# Patient Record
Sex: MALE | Race: BLACK OR AFRICAN AMERICAN | Employment: OTHER | URBAN - METROPOLITAN AREA
[De-identification: names, ages, dates, MRNs, and addresses within clinical notes are randomized per-mention and may not be internally consistent; named-entity substitution may affect disease eponyms.]

---

## 2017-03-03 ENCOUNTER — HOSPITAL ENCOUNTER (OUTPATIENT)
Age: 62
Discharge: HOME | End: 2017-03-03
Payer: COMMERCIAL

## 2017-03-03 LAB
ANION GAP SERPL CALCULATED.3IONS-SCNC: 18 MMOL/L (ref 9–17)
BUN BLDV-MCNC: 14 MG/DL (ref 8–23)
CHLORIDE BLD-SCNC: 101 MMOL/L (ref 98–107)
CHOLESTEROL/HDL RATIO: 3.7
CHOLESTEROL: 282 MG/DL
CO2: 23 MMOL/L (ref 20–31)
CREAT SERPL-MCNC: 0.99 MG/DL (ref 0.7–1.2)
GFR AFRICAN AMERICAN: >60 ML/MIN
GFR NON-AFRICAN AMERICAN: >60 ML/MIN
GFR SERPL CREATININE-BSD FRML MDRD: NORMAL ML/MIN/{1.73_M2}
GFR SERPL CREATININE-BSD FRML MDRD: NORMAL ML/MIN/{1.73_M2}
GLUCOSE BLD-MCNC: 109 MG/DL (ref 70–99)
HDLC SERPL-MCNC: 76 MG/DL
LDL CHOLESTEROL: 196 MG/DL (ref 0–130)
POTASSIUM SERPL-SCNC: 3.9 MMOL/L (ref 3.7–5.3)
SODIUM BLD-SCNC: 142 MMOL/L (ref 135–144)
THYROXINE, FREE: 1.2 NG/DL (ref 0.93–1.7)
TRIGL SERPL-MCNC: 49 MG/DL
TSH SERPL DL<=0.05 MIU/L-ACNC: 0.51 MIU/L (ref 0.3–5)
VLDLC SERPL CALC-MCNC: ABNORMAL MG/DL (ref 1–30)

## 2017-03-03 PROCEDURE — 84520 ASSAY OF UREA NITROGEN: CPT

## 2017-03-03 PROCEDURE — 84439 ASSAY OF FREE THYROXINE: CPT

## 2017-03-03 PROCEDURE — 82947 ASSAY GLUCOSE BLOOD QUANT: CPT

## 2017-03-03 PROCEDURE — 82565 ASSAY OF CREATININE: CPT

## 2017-03-03 PROCEDURE — 84443 ASSAY THYROID STIM HORMONE: CPT

## 2017-03-03 PROCEDURE — 80051 ELECTROLYTE PANEL: CPT

## 2017-03-03 PROCEDURE — 80061 LIPID PANEL: CPT

## 2017-03-03 PROCEDURE — 36415 COLL VENOUS BLD VENIPUNCTURE: CPT

## 2017-03-14 ENCOUNTER — HOSPITAL ENCOUNTER (EMERGENCY)
Facility: CLINIC | Age: 62
Discharge: HOME | End: 2017-03-14
Attending: EMERGENCY MEDICINE
Payer: COMMERCIAL

## 2017-03-14 ENCOUNTER — APPOINTMENT (OUTPATIENT)
Dept: GENERAL RADIOLOGY | Facility: CLINIC | Age: 62
End: 2017-03-14
Payer: COMMERCIAL

## 2017-03-14 VITALS
DIASTOLIC BLOOD PRESSURE: 80 MMHG | RESPIRATION RATE: 19 BRPM | TEMPERATURE: 97.5 F | HEART RATE: 64 BPM | SYSTOLIC BLOOD PRESSURE: 145 MMHG | OXYGEN SATURATION: 96 %

## 2017-03-14 DIAGNOSIS — R07.89 OTHER CHEST PAIN: Primary | ICD-10-CM

## 2017-03-14 LAB
ABSOLUTE EOS #: 0.2 K/UL (ref 0–0.4)
ABSOLUTE LYMPH #: 1.7 K/UL (ref 1–4.8)
ABSOLUTE MONO #: 1.2 K/UL (ref 0.1–1.2)
ANION GAP SERPL CALCULATED.3IONS-SCNC: 13 MMOL/L (ref 9–17)
BASOPHILS # BLD: 0 % (ref 0–2)
BASOPHILS ABSOLUTE: 0 K/UL (ref 0–0.2)
BNP INTERPRETATION: NORMAL
BUN BLDV-MCNC: 13 MG/DL (ref 8–23)
BUN/CREAT BLD: NORMAL (ref 9–20)
CALCIUM SERPL-MCNC: 8.6 MG/DL (ref 8.6–10.4)
CHLORIDE BLD-SCNC: 100 MMOL/L (ref 98–107)
CK MB: 2.1 NG/ML
CO2: 25 MMOL/L (ref 20–31)
CREAT SERPL-MCNC: 1 MG/DL (ref 0.7–1.2)
DIFFERENTIAL TYPE: ABNORMAL
EOSINOPHILS RELATIVE PERCENT: 2 % (ref 1–4)
GFR AFRICAN AMERICAN: >60 ML/MIN
GFR NON-AFRICAN AMERICAN: >60 ML/MIN
GFR SERPL CREATININE-BSD FRML MDRD: NORMAL ML/MIN/{1.73_M2}
GFR SERPL CREATININE-BSD FRML MDRD: NORMAL ML/MIN/{1.73_M2}
GLUCOSE BLD-MCNC: 90 MG/DL (ref 70–99)
HCT VFR BLD CALC: 44.9 % (ref 41–53)
HEMOGLOBIN: 14.6 G/DL (ref 13.5–17.5)
INR BLD: 3.7
LYMPHOCYTES # BLD: 17 % (ref 24–44)
MCH RBC QN AUTO: 28 PG (ref 26–34)
MCHC RBC AUTO-ENTMCNC: 32.5 G/DL (ref 31–37)
MCV RBC AUTO: 86.2 FL (ref 80–100)
MONOCYTES # BLD: 12 % (ref 2–11)
MYOGLOBIN: 45 NG/ML (ref 28–72)
PARTIAL THROMBOPLASTIN TIME: 50 SEC (ref 21.3–31.3)
PDW BLD-RTO: 15.4 % (ref 12.5–15.4)
PLATELET # BLD: 209 K/UL (ref 140–450)
PLATELET ESTIMATE: ABNORMAL
PMV BLD AUTO: 9.7 FL (ref 6–12)
POTASSIUM SERPL-SCNC: 4.1 MMOL/L (ref 3.7–5.3)
PRO-BNP: 58 PG/ML
PROTHROMBIN TIME: 40.4 SEC (ref 9.4–12.6)
RBC # BLD: 5.21 M/UL (ref 4.5–5.9)
RBC # BLD: ABNORMAL 10*6/UL
SEG NEUTROPHILS: 69 % (ref 36–66)
SEGMENTED NEUTROPHILS ABSOLUTE COUNT: 6.9 K/UL (ref 1.8–7.7)
SODIUM BLD-SCNC: 138 MMOL/L (ref 135–144)
TOTAL CK: 1211 U/L (ref 39–308)
TROPONIN INTERP: NORMAL
TROPONIN T: <0.03 NG/ML
WBC # BLD: 10.1 K/UL (ref 3.5–11)
WBC # BLD: ABNORMAL 10*3/UL

## 2017-03-14 PROCEDURE — 84484 ASSAY OF TROPONIN QUANT: CPT

## 2017-03-14 PROCEDURE — 80048 BASIC METABOLIC PNL TOTAL CA: CPT

## 2017-03-14 PROCEDURE — 85610 PROTHROMBIN TIME: CPT

## 2017-03-14 PROCEDURE — 83880 ASSAY OF NATRIURETIC PEPTIDE: CPT

## 2017-03-14 PROCEDURE — 82550 ASSAY OF CK (CPK): CPT

## 2017-03-14 PROCEDURE — 85025 COMPLETE CBC W/AUTO DIFF WBC: CPT

## 2017-03-14 PROCEDURE — 82553 CREATINE MB FRACTION: CPT

## 2017-03-14 PROCEDURE — 85730 THROMBOPLASTIN TIME PARTIAL: CPT

## 2017-03-14 PROCEDURE — 71010 XR CHEST PORTABLE: CPT

## 2017-03-14 PROCEDURE — 83874 ASSAY OF MYOGLOBIN: CPT

## 2017-03-14 PROCEDURE — 36415 COLL VENOUS BLD VENIPUNCTURE: CPT

## 2017-03-14 PROCEDURE — 99285 EMERGENCY DEPT VISIT HI MDM: CPT

## 2017-03-14 PROCEDURE — 93005 ELECTROCARDIOGRAM TRACING: CPT

## 2017-03-14 PROCEDURE — 6370000000 HC RX 637 (ALT 250 FOR IP): Performed by: EMERGENCY MEDICINE

## 2017-03-14 RX ORDER — OXYCODONE HYDROCHLORIDE AND ACETAMINOPHEN 5; 325 MG/1; MG/1
2 TABLET ORAL ONCE
Status: COMPLETED | OUTPATIENT
Start: 2017-03-14 | End: 2017-03-14

## 2017-03-14 RX ORDER — OXYCODONE HYDROCHLORIDE AND ACETAMINOPHEN 5; 325 MG/1; MG/1
1 TABLET ORAL EVERY 6 HOURS PRN
Qty: 12 TABLET | Refills: 0 | Status: SHIPPED | OUTPATIENT
Start: 2017-03-14 | End: 2017-03-19

## 2017-03-14 RX ADMIN — OXYCODONE HYDROCHLORIDE AND ACETAMINOPHEN 2 TABLET: 5; 325 TABLET ORAL at 17:24

## 2017-03-14 ASSESSMENT — ENCOUNTER SYMPTOMS
RESPIRATORY NEGATIVE: 1
GASTROINTESTINAL NEGATIVE: 1
EYES NEGATIVE: 1

## 2017-03-14 ASSESSMENT — PAIN SCALES - GENERAL
PAINLEVEL_OUTOF10: 0
PAINLEVEL_OUTOF10: 4
PAINLEVEL_OUTOF10: 4

## 2017-03-15 LAB
EKG ATRIAL RATE: 64 BPM
EKG P AXIS: 30 DEGREES
EKG P-R INTERVAL: 184 MS
EKG Q-T INTERVAL: 432 MS
EKG QRS DURATION: 144 MS
EKG QTC CALCULATION (BAZETT): 445 MS
EKG R AXIS: -61 DEGREES
EKG T AXIS: 26 DEGREES
EKG VENTRICULAR RATE: 64 BPM

## 2017-05-02 ENCOUNTER — HOSPITAL ENCOUNTER (OUTPATIENT)
Dept: RADIATION ONCOLOGY | Facility: MEDICAL CENTER | Age: 62
Discharge: HOME | End: 2017-05-02
Payer: COMMERCIAL

## 2017-05-02 PROCEDURE — 99212 OFFICE O/P EST SF 10 MIN: CPT | Performed by: RADIOLOGY

## 2017-05-13 ENCOUNTER — HOSPITAL ENCOUNTER (OUTPATIENT)
Age: 62
Setting detail: OBSERVATION
Discharge: HOME | End: 2017-05-14
Attending: INTERNAL MEDICINE | Admitting: INTERNAL MEDICINE
Payer: COMMERCIAL

## 2017-05-13 ENCOUNTER — APPOINTMENT (OUTPATIENT)
Dept: GENERAL RADIOLOGY | Facility: CLINIC | Age: 62
End: 2017-05-13
Payer: COMMERCIAL

## 2017-05-13 ENCOUNTER — HOSPITAL ENCOUNTER (EMERGENCY)
Facility: CLINIC | Age: 62
Discharge: OTHER INSTITUTION | End: 2017-05-13
Attending: EMERGENCY MEDICINE
Payer: COMMERCIAL

## 2017-05-13 VITALS
SYSTOLIC BLOOD PRESSURE: 110 MMHG | HEIGHT: 69 IN | HEART RATE: 65 BPM | OXYGEN SATURATION: 100 % | BODY MASS INDEX: 28.14 KG/M2 | WEIGHT: 190 LBS | RESPIRATION RATE: 19 BRPM | TEMPERATURE: 98.2 F | DIASTOLIC BLOOD PRESSURE: 67 MMHG

## 2017-05-13 DIAGNOSIS — R00.1 SYMPTOMATIC BRADYCARDIA: Primary | ICD-10-CM

## 2017-05-13 LAB
ABSOLUTE EOS #: 0.3 K/UL (ref 0–0.4)
ABSOLUTE LYMPH #: 1.3 K/UL (ref 1–4.8)
ABSOLUTE MONO #: 0.9 K/UL (ref 0.1–1.2)
ANION GAP SERPL CALCULATED.3IONS-SCNC: 12 MMOL/L (ref 9–17)
BASOPHILS # BLD: 0 %
BASOPHILS ABSOLUTE: 0 K/UL (ref 0–0.2)
BUN BLDV-MCNC: 9 MG/DL (ref 8–23)
BUN/CREAT BLD: NORMAL (ref 9–20)
CALCIUM SERPL-MCNC: 8.8 MG/DL (ref 8.6–10.4)
CHLORIDE BLD-SCNC: 103 MMOL/L (ref 98–107)
CO2: 24 MMOL/L (ref 20–31)
CREAT SERPL-MCNC: 1 MG/DL (ref 0.7–1.2)
DIFFERENTIAL TYPE: ABNORMAL
EOSINOPHILS RELATIVE PERCENT: 3 %
FREE THYROXINE INDEX: 9.1 UG/DL (ref 5–11)
GFR AFRICAN AMERICAN: >60 ML/MIN
GFR NON-AFRICAN AMERICAN: >60 ML/MIN
GFR SERPL CREATININE-BSD FRML MDRD: NORMAL ML/MIN/{1.73_M2}
GFR SERPL CREATININE-BSD FRML MDRD: NORMAL ML/MIN/{1.73_M2}
GLUCOSE BLD-MCNC: 84 MG/DL (ref 70–99)
HCT VFR BLD CALC: 43.9 % (ref 41–53)
HEMOGLOBIN: 14.6 G/DL (ref 13.5–17.5)
INR BLD: 2.5
LYMPHOCYTES # BLD: 14 %
MCH RBC QN AUTO: 28.5 PG (ref 26–34)
MCHC RBC AUTO-ENTMCNC: 33.3 G/DL (ref 31–37)
MCV RBC AUTO: 85.5 FL (ref 80–100)
MONOCYTES # BLD: 9 %
PDW BLD-RTO: 15.5 % (ref 12.5–15.4)
PLATELET # BLD: 212 K/UL (ref 140–450)
PLATELET ESTIMATE: ABNORMAL
PMV BLD AUTO: 9.2 FL (ref 6–12)
POTASSIUM SERPL-SCNC: 4 MMOL/L (ref 3.7–5.3)
PROTHROMBIN TIME: 26.8 SEC (ref 9.7–11.6)
RBC # BLD: 5.13 M/UL (ref 4.5–5.9)
RBC # BLD: ABNORMAL 10*6/UL
SEG NEUTROPHILS: 74 %
SEGMENTED NEUTROPHILS ABSOLUTE COUNT: 6.8 K/UL (ref 1.8–7.7)
SODIUM BLD-SCNC: 139 MMOL/L (ref 135–144)
T4 TOTAL: 7.8 UG/DL (ref 4.5–12)
THYROXINE UPTAKE: 34.24 % (ref 28–41)
TROPONIN INTERP: NORMAL
TROPONIN T: <0.03 NG/ML
TSH SERPL DL<=0.05 MIU/L-ACNC: 1.61 MIU/L (ref 0.3–5)
WBC # BLD: 9.2 K/UL (ref 3.5–11)
WBC # BLD: ABNORMAL 10*3/UL

## 2017-05-13 PROCEDURE — 2580000003 HC RX 258: Performed by: INTERNAL MEDICINE

## 2017-05-13 PROCEDURE — 99285 EMERGENCY DEPT VISIT HI MDM: CPT

## 2017-05-13 PROCEDURE — G0378 HOSPITAL OBSERVATION PER HR: HCPCS

## 2017-05-13 PROCEDURE — 84479 ASSAY OF THYROID (T3 OR T4): CPT

## 2017-05-13 PROCEDURE — 93005 ELECTROCARDIOGRAM TRACING: CPT

## 2017-05-13 PROCEDURE — 36000 PLACE NEEDLE IN VEIN: CPT

## 2017-05-13 PROCEDURE — 85610 PROTHROMBIN TIME: CPT

## 2017-05-13 PROCEDURE — 36415 COLL VENOUS BLD VENIPUNCTURE: CPT

## 2017-05-13 PROCEDURE — 85025 COMPLETE CBC W/AUTO DIFF WBC: CPT

## 2017-05-13 PROCEDURE — 84443 ASSAY THYROID STIM HORMONE: CPT

## 2017-05-13 PROCEDURE — 84484 ASSAY OF TROPONIN QUANT: CPT

## 2017-05-13 PROCEDURE — 71010 XR CHEST PORTABLE: CPT

## 2017-05-13 PROCEDURE — 6370000000 HC RX 637 (ALT 250 FOR IP): Performed by: EMERGENCY MEDICINE

## 2017-05-13 PROCEDURE — 80048 BASIC METABOLIC PNL TOTAL CA: CPT

## 2017-05-13 PROCEDURE — 84436 ASSAY OF TOTAL THYROXINE: CPT

## 2017-05-13 PROCEDURE — 6370000000 HC RX 637 (ALT 250 FOR IP): Performed by: INTERNAL MEDICINE

## 2017-05-13 RX ORDER — SODIUM CHLORIDE 0.9 % (FLUSH) 0.9 %
10 SYRINGE (ML) INJECTION EVERY 12 HOURS
Status: DISCONTINUED | OUTPATIENT
Start: 2017-05-13 | End: 2017-05-14 | Stop reason: HOSPADM

## 2017-05-13 RX ORDER — HYDROCODONE BITARTRATE AND ACETAMINOPHEN 5; 325 MG/1; MG/1
2 TABLET ORAL EVERY 6 HOURS PRN
Status: DISCONTINUED | OUTPATIENT
Start: 2017-05-13 | End: 2017-05-14 | Stop reason: HOSPADM

## 2017-05-13 RX ORDER — WARFARIN SODIUM 5 MG/1
5 TABLET ORAL DAILY
Status: DISCONTINUED | OUTPATIENT
Start: 2017-05-13 | End: 2017-05-13

## 2017-05-13 RX ORDER — M-VIT,TX,IRON,MINS/CALC/FOLIC 27MG-0.4MG
1 TABLET ORAL DAILY
Status: DISCONTINUED | OUTPATIENT
Start: 2017-05-13 | End: 2017-05-14 | Stop reason: HOSPADM

## 2017-05-13 RX ORDER — CALCIUM CARBONATE/VITAMIN D3 250-3.125
500 TABLET ORAL DAILY
Status: DISCONTINUED | OUTPATIENT
Start: 2017-05-13 | End: 2017-05-14 | Stop reason: HOSPADM

## 2017-05-13 RX ORDER — ASPIRIN 81 MG/1
324 TABLET, CHEWABLE ORAL ONCE
Status: COMPLETED | OUTPATIENT
Start: 2017-05-13 | End: 2017-05-13

## 2017-05-13 RX ORDER — MECLIZINE HCL 12.5 MG/1
25 TABLET ORAL 3 TIMES DAILY PRN
Status: DISCONTINUED | OUTPATIENT
Start: 2017-05-13 | End: 2017-05-14 | Stop reason: HOSPADM

## 2017-05-13 RX ORDER — PHENAZOPYRIDINE HYDROCHLORIDE 100 MG/1
100 TABLET, FILM COATED ORAL 3 TIMES DAILY PRN
Status: DISCONTINUED | OUTPATIENT
Start: 2017-05-13 | End: 2017-05-14 | Stop reason: HOSPADM

## 2017-05-13 RX ORDER — HYDROCODONE BITARTRATE AND ACETAMINOPHEN 5; 325 MG/1; MG/1
2 TABLET ORAL ONCE
Status: COMPLETED | OUTPATIENT
Start: 2017-05-13 | End: 2017-05-13

## 2017-05-13 RX ORDER — TAMSULOSIN HYDROCHLORIDE 0.4 MG/1
0.4 CAPSULE ORAL DAILY
Status: DISCONTINUED | OUTPATIENT
Start: 2017-05-13 | End: 2017-05-14 | Stop reason: HOSPADM

## 2017-05-13 RX ORDER — WARFARIN SODIUM 5 MG/1
5 TABLET ORAL DAILY
Status: DISCONTINUED | OUTPATIENT
Start: 2017-05-13 | End: 2017-05-14 | Stop reason: HOSPADM

## 2017-05-13 RX ADMIN — Medication 10 ML: at 19:35

## 2017-05-13 RX ADMIN — WARFARIN SODIUM 5 MG: 5 TABLET ORAL at 17:57

## 2017-05-13 RX ADMIN — HYDROCODONE BITARTRATE AND ACETAMINOPHEN 2 TABLET: 5; 325 TABLET ORAL at 09:15

## 2017-05-13 RX ADMIN — ASPIRIN 324 MG: 81 TABLET, CHEWABLE ORAL at 09:50

## 2017-05-13 ASSESSMENT — PAIN DESCRIPTION - LOCATION
LOCATION: NECK;BACK
LOCATION: BACK;NECK
LOCATION: BACK;NECK

## 2017-05-13 ASSESSMENT — PAIN DESCRIPTION - PAIN TYPE
TYPE: ACUTE PAIN
TYPE: ACUTE PAIN;CHRONIC PAIN
TYPE: ACUTE PAIN

## 2017-05-13 ASSESSMENT — PAIN DESCRIPTION - ONSET: ONSET: ON-GOING

## 2017-05-13 ASSESSMENT — PAIN SCALES - GENERAL
PAINLEVEL_OUTOF10: 8
PAINLEVEL_OUTOF10: 2
PAINLEVEL_OUTOF10: 8
PAINLEVEL_OUTOF10: 0

## 2017-05-13 ASSESSMENT — PAIN DESCRIPTION - ORIENTATION
ORIENTATION: UPPER;MID

## 2017-05-13 ASSESSMENT — PAIN DESCRIPTION - FREQUENCY: FREQUENCY: CONTINUOUS

## 2017-05-14 VITALS
WEIGHT: 188.71 LBS | TEMPERATURE: 97.7 F | RESPIRATION RATE: 16 BRPM | SYSTOLIC BLOOD PRESSURE: 126 MMHG | HEIGHT: 69 IN | BODY MASS INDEX: 27.95 KG/M2 | OXYGEN SATURATION: 99 % | DIASTOLIC BLOOD PRESSURE: 86 MMHG | HEART RATE: 78 BPM

## 2017-05-14 LAB
ANION GAP SERPL CALCULATED.3IONS-SCNC: 10 MMOL/L (ref 9–17)
BUN BLDV-MCNC: 10 MG/DL (ref 8–23)
BUN/CREAT BLD: 10 (ref 9–20)
CALCIUM SERPL-MCNC: 8.2 MG/DL (ref 8.6–10.4)
CHLORIDE BLD-SCNC: 111 MMOL/L (ref 98–107)
CO2: 22 MMOL/L (ref 20–31)
CREAT SERPL-MCNC: 0.96 MG/DL (ref 0.7–1.2)
GFR AFRICAN AMERICAN: >60 ML/MIN
GFR NON-AFRICAN AMERICAN: >60 ML/MIN
GFR SERPL CREATININE-BSD FRML MDRD: ABNORMAL ML/MIN/{1.73_M2}
GFR SERPL CREATININE-BSD FRML MDRD: ABNORMAL ML/MIN/{1.73_M2}
GLUCOSE BLD-MCNC: 100 MG/DL (ref 70–99)
HCT VFR BLD CALC: 42.4 % (ref 41–53)
HEMOGLOBIN: 13.8 G/DL (ref 13.5–17.5)
INR BLD: 2.9
MAGNESIUM: 1.8 MG/DL (ref 1.6–2.6)
MCH RBC QN AUTO: 28.1 PG (ref 26–34)
MCHC RBC AUTO-ENTMCNC: 32.4 G/DL (ref 31–37)
MCV RBC AUTO: 86.6 FL (ref 80–100)
PDW BLD-RTO: 15.6 % (ref 11.5–14.5)
PLATELET # BLD: 221 K/UL (ref 130–400)
PMV BLD AUTO: 9.6 FL (ref 6–12)
POTASSIUM SERPL-SCNC: 4.3 MMOL/L (ref 3.7–5.3)
PROTHROMBIN TIME: 30.8 SEC (ref 9.7–11.6)
RBC # BLD: 4.9 M/UL (ref 4.5–5.9)
SODIUM BLD-SCNC: 143 MMOL/L (ref 135–144)
WBC # BLD: 7 K/UL (ref 3.5–11)

## 2017-05-14 PROCEDURE — 85610 PROTHROMBIN TIME: CPT

## 2017-05-14 PROCEDURE — 6370000000 HC RX 637 (ALT 250 FOR IP): Performed by: INTERNAL MEDICINE

## 2017-05-14 PROCEDURE — 83735 ASSAY OF MAGNESIUM: CPT

## 2017-05-14 PROCEDURE — G0378 HOSPITAL OBSERVATION PER HR: HCPCS

## 2017-05-14 PROCEDURE — 36415 COLL VENOUS BLD VENIPUNCTURE: CPT

## 2017-05-14 PROCEDURE — 85027 COMPLETE CBC AUTOMATED: CPT

## 2017-05-14 PROCEDURE — 80048 BASIC METABOLIC PNL TOTAL CA: CPT

## 2017-05-14 RX ADMIN — HYDROCODONE BITARTRATE AND ACETAMINOPHEN 2 TABLET: 5; 325 TABLET ORAL at 08:39

## 2017-05-14 RX ADMIN — MULTIPLE VITAMINS W/ MINERALS TAB 1 TABLET: TAB at 08:40

## 2017-05-14 RX ADMIN — TAMSULOSIN HYDROCHLORIDE 0.4 MG: 0.4 CAPSULE ORAL at 08:40

## 2017-05-14 RX ADMIN — CALCIUM CARBONATE-CHOLECALCIFEROL TAB 250 MG-125 UNIT 500 MG: 250-125 TAB at 08:40

## 2017-05-14 ASSESSMENT — PAIN DESCRIPTION - ONSET: ONSET: ON-GOING

## 2017-05-14 ASSESSMENT — PAIN DESCRIPTION - ORIENTATION: ORIENTATION: UPPER;MID

## 2017-05-14 ASSESSMENT — PAIN DESCRIPTION - DESCRIPTORS: DESCRIPTORS: ACHING

## 2017-05-14 ASSESSMENT — PAIN DESCRIPTION - PAIN TYPE: TYPE: ACUTE PAIN

## 2017-05-14 ASSESSMENT — PAIN SCALES - GENERAL
PAINLEVEL_OUTOF10: 7
PAINLEVEL_OUTOF10: 2
PAINLEVEL_OUTOF10: 0

## 2017-05-14 ASSESSMENT — PAIN DESCRIPTION - LOCATION: LOCATION: BACK;NECK

## 2017-05-14 ASSESSMENT — PAIN DESCRIPTION - PROGRESSION: CLINICAL_PROGRESSION: GRADUALLY IMPROVING

## 2017-05-14 ASSESSMENT — PAIN DESCRIPTION - FREQUENCY: FREQUENCY: CONTINUOUS

## 2017-05-15 ENCOUNTER — HOSPITAL ENCOUNTER (OUTPATIENT)
Dept: INTERVENTIONAL RADIOLOGY/VASCULAR | Age: 62
Discharge: HOME | End: 2017-05-15
Payer: COMMERCIAL

## 2017-05-15 ENCOUNTER — HOSPITAL ENCOUNTER (OUTPATIENT)
Age: 62
Discharge: HOME | End: 2017-05-15
Payer: COMMERCIAL

## 2017-05-15 LAB — VITAMIN D 25-HYDROXY: 41.5 NG/ML (ref 30–100)

## 2017-05-15 PROCEDURE — 36415 COLL VENOUS BLD VENIPUNCTURE: CPT

## 2017-05-15 PROCEDURE — 82306 VITAMIN D 25 HYDROXY: CPT

## 2017-05-16 LAB
EKG ATRIAL RATE: 47 BPM
EKG ATRIAL RATE: 52 BPM
EKG P AXIS: 27 DEGREES
EKG P-R INTERVAL: 198 MS
EKG P-R INTERVAL: 204 MS
EKG Q-T INTERVAL: 458 MS
EKG Q-T INTERVAL: 458 MS
EKG QRS DURATION: 138 MS
EKG QRS DURATION: 142 MS
EKG QTC CALCULATION (BAZETT): 405 MS
EKG QTC CALCULATION (BAZETT): 425 MS
EKG R AXIS: -124 DEGREES
EKG R AXIS: -55 DEGREES
EKG T AXIS: -172 DEGREES
EKG T AXIS: -2 DEGREES
EKG VENTRICULAR RATE: 47 BPM
EKG VENTRICULAR RATE: 52 BPM

## 2017-06-14 ENCOUNTER — HOSPITAL ENCOUNTER (OUTPATIENT)
Dept: RADIATION ONCOLOGY | Facility: MEDICAL CENTER | Age: 62
Discharge: HOME | End: 2017-06-14

## 2017-06-14 PROCEDURE — 99212 OFFICE O/P EST SF 10 MIN: CPT | Performed by: RADIOLOGY

## 2017-06-28 ENCOUNTER — HOSPITAL ENCOUNTER (OUTPATIENT)
Dept: CT IMAGING | Age: 62
Discharge: HOME | End: 2017-06-28
Payer: COMMERCIAL

## 2017-06-28 VITALS
RESPIRATION RATE: 18 BRPM | WEIGHT: 184.53 LBS | OXYGEN SATURATION: 93 % | HEART RATE: 59 BPM | BODY MASS INDEX: 27.33 KG/M2 | SYSTOLIC BLOOD PRESSURE: 125 MMHG | TEMPERATURE: 97.3 F | HEIGHT: 69 IN | DIASTOLIC BLOOD PRESSURE: 72 MMHG

## 2017-06-28 DIAGNOSIS — R19.09 UPPER ABDOMINAL MASS: ICD-10-CM

## 2017-06-28 LAB
INR BLD: 1.1
PARTIAL THROMBOPLASTIN TIME: 35.2 SEC (ref 23–31)
PLATELET # BLD: 203 K/UL (ref 130–400)
PROTHROMBIN TIME: 11.8 SEC (ref 9.7–11.6)

## 2017-06-28 PROCEDURE — 85730 THROMBOPLASTIN TIME PARTIAL: CPT

## 2017-06-28 PROCEDURE — 88333 PATH CONSLTJ SURG CYTO XM 1: CPT

## 2017-06-28 PROCEDURE — 7100000011 HC PHASE II RECOVERY - ADDTL 15 MIN

## 2017-06-28 PROCEDURE — 7100000010 HC PHASE II RECOVERY - FIRST 15 MIN

## 2017-06-28 PROCEDURE — 6360000002 HC RX W HCPCS: Performed by: RADIOLOGY

## 2017-06-28 PROCEDURE — 88305 TISSUE EXAM BY PATHOLOGIST: CPT

## 2017-06-28 PROCEDURE — 2580000003 HC RX 258: Performed by: RADIOLOGY

## 2017-06-28 PROCEDURE — 49180 BIOPSY ABDOMINAL MASS: CPT

## 2017-06-28 PROCEDURE — 85610 PROTHROMBIN TIME: CPT

## 2017-06-28 PROCEDURE — 85049 AUTOMATED PLATELET COUNT: CPT

## 2017-06-28 PROCEDURE — 77012 CT SCAN FOR NEEDLE BIOPSY: CPT

## 2017-06-28 RX ORDER — FENTANYL CITRATE 50 UG/ML
INJECTION, SOLUTION INTRAMUSCULAR; INTRAVENOUS
Status: COMPLETED | OUTPATIENT
Start: 2017-06-28 | End: 2017-06-28

## 2017-06-28 RX ORDER — SIMVASTATIN 20 MG
20 TABLET ORAL NIGHTLY
COMMUNITY
End: 2018-01-01 | Stop reason: ALTCHOICE

## 2017-06-28 RX ORDER — SODIUM CHLORIDE 0.9 % (FLUSH) 0.9 %
10 SYRINGE (ML) INJECTION PRN
Status: DISCONTINUED | OUTPATIENT
Start: 2017-06-28 | End: 2017-07-01 | Stop reason: HOSPADM

## 2017-06-28 RX ORDER — ACETAMINOPHEN 325 MG/1
650 TABLET ORAL EVERY 4 HOURS PRN
Status: DISCONTINUED | OUTPATIENT
Start: 2017-06-28 | End: 2017-07-01 | Stop reason: HOSPADM

## 2017-06-28 RX ORDER — SODIUM CHLORIDE 9 MG/ML
INJECTION, SOLUTION INTRAVENOUS CONTINUOUS
Status: DISCONTINUED | OUTPATIENT
Start: 2017-06-28 | End: 2017-07-01 | Stop reason: HOSPADM

## 2017-06-28 RX ADMIN — SODIUM CHLORIDE: 9 INJECTION, SOLUTION INTRAVENOUS at 09:40

## 2017-06-28 RX ADMIN — FENTANYL CITRATE 50 MCG: 50 INJECTION, SOLUTION INTRAMUSCULAR; INTRAVENOUS at 13:12

## 2017-06-28 ASSESSMENT — PAIN SCALES - GENERAL
PAINLEVEL_OUTOF10: 0
PAINLEVEL_OUTOF10: 5
PAINLEVEL_OUTOF10: 0
PAINLEVEL_OUTOF10: 0

## 2017-06-28 ASSESSMENT — PAIN DESCRIPTION - DESCRIPTORS: DESCRIPTORS: ACHING

## 2017-06-28 ASSESSMENT — PAIN - FUNCTIONAL ASSESSMENT: PAIN_FUNCTIONAL_ASSESSMENT: 0-10

## 2017-06-28 ASSESSMENT — PAIN DESCRIPTION - PAIN TYPE: TYPE: CHRONIC PAIN

## 2017-06-28 ASSESSMENT — PAIN DESCRIPTION - LOCATION: LOCATION: ABDOMEN

## 2017-07-03 LAB — SURGICAL PATHOLOGY REPORT: NORMAL

## 2017-07-27 ENCOUNTER — TELEPHONE (OUTPATIENT)
Dept: RADIATION ONCOLOGY | Facility: MEDICAL CENTER | Age: 62
End: 2017-07-27

## 2017-07-28 ENCOUNTER — HOSPITAL ENCOUNTER (OUTPATIENT)
Dept: CT IMAGING | Age: 62
Discharge: HOME | End: 2017-07-28
Payer: COMMERCIAL

## 2017-07-28 DIAGNOSIS — K86.3 CYST AND PSEUDOCYST OF PANCREAS: ICD-10-CM

## 2017-07-28 DIAGNOSIS — K86.2 CYST AND PSEUDOCYST OF PANCREAS: ICD-10-CM

## 2017-07-28 LAB
BUN BLDV-MCNC: 7 MG/DL (ref 8–23)
CREAT SERPL-MCNC: 1.06 MG/DL (ref 0.7–1.2)
GFR AFRICAN AMERICAN: >60 ML/MIN
GFR NON-AFRICAN AMERICAN: >60 ML/MIN
GFR SERPL CREATININE-BSD FRML MDRD: NORMAL ML/MIN/{1.73_M2}
GFR SERPL CREATININE-BSD FRML MDRD: NORMAL ML/MIN/{1.73_M2}

## 2017-07-28 PROCEDURE — 6360000004 HC RX CONTRAST MEDICATION: Performed by: PHYSICIAN ASSISTANT

## 2017-07-28 PROCEDURE — 36415 COLL VENOUS BLD VENIPUNCTURE: CPT

## 2017-07-28 PROCEDURE — 82565 ASSAY OF CREATININE: CPT

## 2017-07-28 PROCEDURE — 84520 ASSAY OF UREA NITROGEN: CPT

## 2017-07-28 PROCEDURE — 74170 CT ABD WO CNTRST FLWD CNTRST: CPT

## 2017-07-28 RX ADMIN — IOVERSOL 100 ML: 741 INJECTION INTRA-ARTERIAL; INTRAVENOUS at 10:13

## 2017-09-20 ENCOUNTER — HOSPITAL ENCOUNTER (OUTPATIENT)
Age: 62
Discharge: HOME | End: 2017-09-20
Payer: COMMERCIAL

## 2017-09-20 LAB — PROSTATE SPECIFIC ANTIGEN: 0.37 UG/L

## 2017-09-20 PROCEDURE — 36415 COLL VENOUS BLD VENIPUNCTURE: CPT

## 2017-09-20 PROCEDURE — G0103 PSA SCREENING: HCPCS

## 2017-11-30 ENCOUNTER — HOSPITAL ENCOUNTER (OUTPATIENT)
Age: 62
Setting detail: SPECIMEN
Discharge: HOME | End: 2017-11-30
Payer: COMMERCIAL

## 2017-11-30 LAB
ABSOLUTE EOS #: 0.18 K/UL (ref 0–0.44)
ABSOLUTE IMMATURE GRANULOCYTE: <0.03 K/UL (ref 0–0.3)
ABSOLUTE LYMPH #: 1.12 K/UL (ref 1.1–3.7)
ABSOLUTE MONO #: 0.71 K/UL (ref 0.1–1.2)
BASOPHILS # BLD: 0 % (ref 0–2)
BASOPHILS ABSOLUTE: 0.03 K/UL (ref 0–0.2)
DIFFERENTIAL TYPE: ABNORMAL
EOSINOPHILS RELATIVE PERCENT: 2 % (ref 1–4)
HCT VFR BLD CALC: 42.7 % (ref 40.7–50.3)
HEMOGLOBIN: 13.3 G/DL (ref 13–17)
IMMATURE GRANULOCYTES: 0 %
LYMPHOCYTES # BLD: 14 % (ref 24–43)
MCH RBC QN AUTO: 28.5 PG (ref 25.2–33.5)
MCHC RBC AUTO-ENTMCNC: 31.1 G/DL (ref 28.4–34.8)
MCV RBC AUTO: 91.6 FL (ref 82.6–102.9)
MONOCYTES # BLD: 9 % (ref 3–12)
PDW BLD-RTO: 14 % (ref 11.8–14.4)
PLATELET # BLD: 246 K/UL (ref 138–453)
PLATELET ESTIMATE: ABNORMAL
PMV BLD AUTO: 11.8 FL (ref 8.1–13.5)
RBC # BLD: 4.66 M/UL (ref 4.21–5.77)
RBC # BLD: ABNORMAL 10*6/UL
SEG NEUTROPHILS: 75 % (ref 36–65)
SEGMENTED NEUTROPHILS ABSOLUTE COUNT: 5.91 K/UL (ref 1.5–8.1)
WBC # BLD: 8 K/UL (ref 3.5–11.3)
WBC # BLD: ABNORMAL 10*3/UL

## 2018-01-01 ENCOUNTER — HOSPITAL ENCOUNTER (EMERGENCY)
Age: 63
Discharge: HOME | End: 2018-09-25
Payer: COMMERCIAL

## 2018-01-01 ENCOUNTER — OFFICE VISIT (OUTPATIENT)
Dept: INFECTIOUS DISEASES | Age: 63
End: 2018-01-01
Payer: COMMERCIAL

## 2018-01-01 VITALS
OXYGEN SATURATION: 100 % | TEMPERATURE: 97.8 F | HEART RATE: 62 BPM | BODY MASS INDEX: 24.73 KG/M2 | HEIGHT: 69 IN | DIASTOLIC BLOOD PRESSURE: 68 MMHG | RESPIRATION RATE: 18 BRPM | WEIGHT: 167 LBS | SYSTOLIC BLOOD PRESSURE: 122 MMHG

## 2018-01-01 VITALS
HEART RATE: 56 BPM | HEIGHT: 69 IN | WEIGHT: 164 LBS | DIASTOLIC BLOOD PRESSURE: 79 MMHG | SYSTOLIC BLOOD PRESSURE: 123 MMHG | BODY MASS INDEX: 24.29 KG/M2 | TEMPERATURE: 96.9 F

## 2018-01-01 VITALS
TEMPERATURE: 96.6 F | HEIGHT: 69 IN | BODY MASS INDEX: 23.99 KG/M2 | DIASTOLIC BLOOD PRESSURE: 89 MMHG | WEIGHT: 162 LBS | HEART RATE: 80 BPM | SYSTOLIC BLOOD PRESSURE: 132 MMHG

## 2018-01-01 DIAGNOSIS — I38 PROSTHETIC VALVE ENDOCARDITIS, SUBSEQUENT ENCOUNTER: Primary | ICD-10-CM

## 2018-01-01 DIAGNOSIS — T82.6XXD PROSTHETIC VALVE ENDOCARDITIS, SUBSEQUENT ENCOUNTER: Primary | ICD-10-CM

## 2018-01-01 DIAGNOSIS — C25.9 MALIGNANT NEOPLASM OF PANCREAS, UNSPECIFIED LOCATION OF MALIGNANCY (HCC): ICD-10-CM

## 2018-01-01 DIAGNOSIS — Z20.3 CONTACT WITH OR EXPOSURE TO RABIES: Primary | ICD-10-CM

## 2018-01-01 DIAGNOSIS — B07.9 VIRAL WARTS, UNSPECIFIED TYPE: ICD-10-CM

## 2018-01-01 DIAGNOSIS — Z98.890 S/P LYMPH NODE BIOPSY: ICD-10-CM

## 2018-01-01 PROCEDURE — G8598 ASA/ANTIPLAT THER USED: HCPCS | Performed by: INTERNAL MEDICINE

## 2018-01-01 PROCEDURE — G8427 DOCREV CUR MEDS BY ELIG CLIN: HCPCS | Performed by: INTERNAL MEDICINE

## 2018-01-01 PROCEDURE — 1036F TOBACCO NON-USER: CPT | Performed by: INTERNAL MEDICINE

## 2018-01-01 PROCEDURE — G8420 CALC BMI NORM PARAMETERS: HCPCS | Performed by: INTERNAL MEDICINE

## 2018-01-01 PROCEDURE — 99214 OFFICE O/P EST MOD 30 MIN: CPT | Performed by: INTERNAL MEDICINE

## 2018-01-01 PROCEDURE — G8484 FLU IMMUNIZE NO ADMIN: HCPCS | Performed by: INTERNAL MEDICINE

## 2018-01-01 PROCEDURE — 3017F COLORECTAL CA SCREEN DOC REV: CPT | Performed by: INTERNAL MEDICINE

## 2018-01-01 PROCEDURE — 99283 EMERGENCY DEPT VISIT LOW MDM: CPT

## 2018-01-01 RX ORDER — AMOXICILLIN 500 MG/1
500 CAPSULE ORAL 2 TIMES DAILY
Qty: 60 CAPSULE | Refills: 5 | Status: SHIPPED | OUTPATIENT
Start: 2018-01-01 | End: 2019-01-01 | Stop reason: SDUPTHER

## 2018-01-01 RX ORDER — AMOXICILLIN 500 MG/1
500 CAPSULE ORAL 2 TIMES DAILY
Qty: 60 CAPSULE | Refills: 1 | Status: SHIPPED | OUTPATIENT
Start: 2018-01-01 | End: 2018-01-01 | Stop reason: SDUPTHER

## 2018-01-01 RX ORDER — FERROUS SULFATE 325(65) MG
325 TABLET ORAL
Status: ON HOLD | COMMUNITY
End: 2019-01-01 | Stop reason: ALTCHOICE

## 2018-01-01 RX ORDER — AMOXICILLIN 500 MG/1
500 CAPSULE ORAL 2 TIMES DAILY
COMMUNITY
End: 2018-01-01 | Stop reason: SDUPTHER

## 2018-01-01 RX ORDER — AMOXICILLIN 500 MG/1
500 CAPSULE ORAL 2 TIMES DAILY
Qty: 60 CAPSULE | Refills: 0 | Status: SHIPPED | OUTPATIENT
Start: 2018-01-01 | End: 2018-01-01

## 2018-01-01 ASSESSMENT — ENCOUNTER SYMPTOMS
RESPIRATORY NEGATIVE: 1
ALLERGIC/IMMUNOLOGIC NEGATIVE: 1
ABDOMINAL PAIN: 1
RESPIRATORY NEGATIVE: 1
GASTROINTESTINAL NEGATIVE: 1
EYES NEGATIVE: 1
ALLERGIC/IMMUNOLOGIC NEGATIVE: 1

## 2018-03-08 ENCOUNTER — TELEPHONE (OUTPATIENT)
Dept: PULMONOLOGY | Age: 63
End: 2018-03-08

## 2018-03-16 ENCOUNTER — OFFICE VISIT (OUTPATIENT)
Dept: INFECTIOUS DISEASES | Age: 63
End: 2018-03-16
Payer: COMMERCIAL

## 2018-03-16 VITALS
SYSTOLIC BLOOD PRESSURE: 92 MMHG | HEART RATE: 84 BPM | OXYGEN SATURATION: 99 % | WEIGHT: 149.4 LBS | BODY MASS INDEX: 22.13 KG/M2 | TEMPERATURE: 97.6 F | DIASTOLIC BLOOD PRESSURE: 62 MMHG | HEIGHT: 69 IN | RESPIRATION RATE: 16 BRPM

## 2018-03-16 DIAGNOSIS — R78.81 ENTEROCOCCAL BACTEREMIA: Primary | ICD-10-CM

## 2018-03-16 DIAGNOSIS — C25.9 MALIGNANT NEOPLASM OF PANCREAS, UNSPECIFIED LOCATION OF MALIGNANCY (HCC): ICD-10-CM

## 2018-03-16 DIAGNOSIS — B95.2 ENTEROCOCCAL BACTEREMIA: Primary | ICD-10-CM

## 2018-03-16 DIAGNOSIS — T14.8XXA WOUND DISCHARGE: ICD-10-CM

## 2018-03-16 PROCEDURE — G8598 ASA/ANTIPLAT THER USED: HCPCS | Performed by: INTERNAL MEDICINE

## 2018-03-16 PROCEDURE — 1036F TOBACCO NON-USER: CPT | Performed by: INTERNAL MEDICINE

## 2018-03-16 PROCEDURE — 99214 OFFICE O/P EST MOD 30 MIN: CPT | Performed by: INTERNAL MEDICINE

## 2018-03-16 PROCEDURE — G8420 CALC BMI NORM PARAMETERS: HCPCS | Performed by: INTERNAL MEDICINE

## 2018-03-16 PROCEDURE — G8484 FLU IMMUNIZE NO ADMIN: HCPCS | Performed by: INTERNAL MEDICINE

## 2018-03-16 PROCEDURE — 3017F COLORECTAL CA SCREEN DOC REV: CPT | Performed by: INTERNAL MEDICINE

## 2018-03-16 PROCEDURE — G8427 DOCREV CUR MEDS BY ELIG CLIN: HCPCS | Performed by: INTERNAL MEDICINE

## 2018-03-16 RX ORDER — ACETAMINOPHEN 500 MG
650 TABLET ORAL EVERY 8 HOURS PRN
Status: ON HOLD | COMMUNITY
End: 2019-01-01 | Stop reason: HOSPADM

## 2018-03-16 RX ORDER — OXYCODONE HYDROCHLORIDE 5 MG/1
1 TABLET ORAL EVERY 8 HOURS PRN
COMMUNITY
Start: 2018-02-23 | End: 2018-01-01 | Stop reason: ALTCHOICE

## 2018-03-16 ASSESSMENT — ENCOUNTER SYMPTOMS
RESPIRATORY NEGATIVE: 1
EYES NEGATIVE: 1
GASTROINTESTINAL NEGATIVE: 1
ALLERGIC/IMMUNOLOGIC NEGATIVE: 1

## 2018-03-16 NOTE — PROGRESS NOTES
negative. The patient is here with his wife and he does report he is yet to see Dr. Alok Jain from oncology, but he did meet with the radiation oncologist.  The patient does report some generalized malaise, weakness. He has episodes of nausea. He does not have any diarrhea. He still does have an anterior abdominal wall postoperative/incisional wound. I have personally reviewed the past medical history, medications, social history, and I have updated the database accordingly. Past Medical History:     Past Medical History:   Diagnosis Date    Acid reflux     Arthritis     CAD (coronary artery disease)     Pancreatic carcinoma (HCC)     Positive cardiac stress test     Prostate cancer (HCC)     Elevated PSA     Unspecified cerebral artery occlusion with cerebral infarction     TIA     Medications:     Current Outpatient Prescriptions   Medication Sig Dispense Refill    oxyCODONE (ROXICODONE) 5 MG immediate release tablet Take 1 tablet by mouth every 8 hours as needed.  acetaminophen (TYLENOL) 500 MG tablet Take 650 mg by mouth every 8 hours as needed for Pain      simvastatin (ZOCOR) 20 MG tablet Take 20 mg by mouth nightly      Cyclobenzaprine HCl (FLEXERIL PO) Take by mouth      Meloxicam (MOBIC PO) Take by mouth      warfarin (COUMADIN) 5 MG tablet Take 5 mg by mouth daily Indications: TAKES 1 OR 2 TABS DEPENDING ON INR i tab M/TH/F SAT/SUN  1 1/5 TU/ WED      Multiple Vitamins-Minerals (THERAPEUTIC MULTIVITAMIN-MINERALS) tablet Take 1 tablet by mouth daily.  Calcium Carb-Cholecalciferol (OYSTER SHELL CALCIUM/VITAMIN D) 500-200 MG-UNIT TABS Take 1 tablet by mouth daily       No current facility-administered medications for this visit. Allergies:   Patient has no known allergies. Review of Systems:   Review of Systems   Constitutional: Positive for fatigue and fever (low grade). HENT: Negative. Eyes: Negative. Respiratory: Negative. Cardiovascular: Negative. including those of syntax and sound a like substitutions which may escape proof reading. It such instances, actual meaning can be extrapolated by contextual diversion.

## 2018-04-16 ENCOUNTER — OFFICE VISIT (OUTPATIENT)
Dept: INFECTIOUS DISEASES | Age: 63
End: 2018-04-16
Payer: COMMERCIAL

## 2018-04-16 VITALS
BODY MASS INDEX: 22.87 KG/M2 | HEIGHT: 69 IN | RESPIRATION RATE: 12 BRPM | WEIGHT: 154.4 LBS | SYSTOLIC BLOOD PRESSURE: 110 MMHG | HEART RATE: 77 BPM | DIASTOLIC BLOOD PRESSURE: 79 MMHG | TEMPERATURE: 96.8 F

## 2018-04-16 DIAGNOSIS — T14.8XXA WOUND DISCHARGE: ICD-10-CM

## 2018-04-16 DIAGNOSIS — I38 PROSTHETIC VALVE ENDOCARDITIS, SUBSEQUENT ENCOUNTER: Primary | ICD-10-CM

## 2018-04-16 DIAGNOSIS — A41.81 ENTEROCOCCAL SEPSIS (HCC): ICD-10-CM

## 2018-04-16 DIAGNOSIS — C25.9 MALIGNANT NEOPLASM OF PANCREAS, UNSPECIFIED LOCATION OF MALIGNANCY (HCC): ICD-10-CM

## 2018-04-16 DIAGNOSIS — T82.6XXD PROSTHETIC VALVE ENDOCARDITIS, SUBSEQUENT ENCOUNTER: Primary | ICD-10-CM

## 2018-04-16 PROCEDURE — 99214 OFFICE O/P EST MOD 30 MIN: CPT | Performed by: INTERNAL MEDICINE

## 2018-04-16 PROCEDURE — 1036F TOBACCO NON-USER: CPT | Performed by: INTERNAL MEDICINE

## 2018-04-16 PROCEDURE — 3017F COLORECTAL CA SCREEN DOC REV: CPT | Performed by: INTERNAL MEDICINE

## 2018-04-16 PROCEDURE — G8427 DOCREV CUR MEDS BY ELIG CLIN: HCPCS | Performed by: INTERNAL MEDICINE

## 2018-04-16 PROCEDURE — G8420 CALC BMI NORM PARAMETERS: HCPCS | Performed by: INTERNAL MEDICINE

## 2018-04-16 PROCEDURE — G8598 ASA/ANTIPLAT THER USED: HCPCS | Performed by: INTERNAL MEDICINE

## 2018-04-16 RX ORDER — PANTOPRAZOLE SODIUM 40 MG/1
TABLET, DELAYED RELEASE ORAL
Status: ON HOLD | COMMUNITY
Start: 2018-02-26 | End: 2019-01-01 | Stop reason: ALTCHOICE

## 2018-04-16 RX ORDER — ONDANSETRON 4 MG/1
4 TABLET, FILM COATED ORAL
COMMUNITY
Start: 2018-03-10 | End: 2018-01-01 | Stop reason: ALTCHOICE

## 2018-04-20 ENCOUNTER — TELEPHONE (OUTPATIENT)
Dept: INFECTIOUS DISEASES | Age: 63
End: 2018-04-20

## 2018-05-07 ENCOUNTER — HOSPITAL ENCOUNTER (OUTPATIENT)
Age: 63
Discharge: HOME | End: 2018-05-07
Payer: COMMERCIAL

## 2018-05-08 DIAGNOSIS — I38 PROSTHETIC VALVE ENDOCARDITIS, SUBSEQUENT ENCOUNTER: ICD-10-CM

## 2018-05-08 DIAGNOSIS — T82.6XXD PROSTHETIC VALVE ENDOCARDITIS, SUBSEQUENT ENCOUNTER: ICD-10-CM

## 2018-05-08 DIAGNOSIS — A41.81 ENTEROCOCCAL SEPSIS (HCC): ICD-10-CM

## 2018-05-09 ENCOUNTER — TELEPHONE (OUTPATIENT)
Dept: PULMONOLOGY | Age: 63
End: 2018-05-09

## 2018-05-10 ENCOUNTER — HOSPITAL ENCOUNTER (OUTPATIENT)
Dept: RADIATION ONCOLOGY | Facility: MEDICAL CENTER | Age: 63
Discharge: HOME | End: 2018-05-10

## 2018-05-10 PROCEDURE — 99212 OFFICE O/P EST SF 10 MIN: CPT | Performed by: RADIOLOGY

## 2018-05-16 ENCOUNTER — HOSPITAL ENCOUNTER (OUTPATIENT)
Age: 63
Discharge: HOME | End: 2018-05-16
Payer: COMMERCIAL

## 2018-05-16 ENCOUNTER — OFFICE VISIT (OUTPATIENT)
Dept: INFECTIOUS DISEASES | Age: 63
End: 2018-05-16
Payer: COMMERCIAL

## 2018-05-16 VITALS
TEMPERATURE: 97.2 F | DIASTOLIC BLOOD PRESSURE: 88 MMHG | SYSTOLIC BLOOD PRESSURE: 131 MMHG | HEIGHT: 69 IN | WEIGHT: 159 LBS | RESPIRATION RATE: 14 BRPM | HEART RATE: 69 BPM | BODY MASS INDEX: 23.55 KG/M2

## 2018-05-16 DIAGNOSIS — I38 PROSTHETIC VALVE ENDOCARDITIS, SUBSEQUENT ENCOUNTER: Primary | ICD-10-CM

## 2018-05-16 DIAGNOSIS — A41.81 ENTEROCOCCAL SEPSIS (HCC): ICD-10-CM

## 2018-05-16 DIAGNOSIS — T82.6XXD PROSTHETIC VALVE ENDOCARDITIS, SUBSEQUENT ENCOUNTER: Primary | ICD-10-CM

## 2018-05-16 DIAGNOSIS — C25.9 MALIGNANT NEOPLASM OF PANCREAS, UNSPECIFIED LOCATION OF MALIGNANCY (HCC): ICD-10-CM

## 2018-05-16 PROCEDURE — 1036F TOBACCO NON-USER: CPT | Performed by: INTERNAL MEDICINE

## 2018-05-16 PROCEDURE — G8427 DOCREV CUR MEDS BY ELIG CLIN: HCPCS | Performed by: INTERNAL MEDICINE

## 2018-05-16 PROCEDURE — 99214 OFFICE O/P EST MOD 30 MIN: CPT | Performed by: INTERNAL MEDICINE

## 2018-05-16 PROCEDURE — 3017F COLORECTAL CA SCREEN DOC REV: CPT | Performed by: INTERNAL MEDICINE

## 2018-05-16 PROCEDURE — G8598 ASA/ANTIPLAT THER USED: HCPCS | Performed by: INTERNAL MEDICINE

## 2018-05-16 PROCEDURE — G8420 CALC BMI NORM PARAMETERS: HCPCS | Performed by: INTERNAL MEDICINE

## 2018-05-16 RX ORDER — AMOXICILLIN 500 MG/1
500 CAPSULE ORAL 2 TIMES DAILY
Qty: 60 CAPSULE | Refills: 3 | Status: SHIPPED | OUTPATIENT
Start: 2018-05-16 | End: 2018-06-15

## 2018-05-16 RX ORDER — PROCHLORPERAZINE MALEATE 10 MG
10 TABLET ORAL
COMMUNITY
Start: 2018-04-25 | End: 2018-01-01 | Stop reason: ALTCHOICE

## 2018-05-16 ASSESSMENT — ENCOUNTER SYMPTOMS
GASTROINTESTINAL NEGATIVE: 1
EYES NEGATIVE: 1
ALLERGIC/IMMUNOLOGIC NEGATIVE: 1
RESPIRATORY NEGATIVE: 1

## 2018-05-17 ENCOUNTER — HOSPITAL ENCOUNTER (OUTPATIENT)
Age: 63
Discharge: HOME | End: 2018-05-17
Payer: COMMERCIAL

## 2018-05-17 DIAGNOSIS — A41.81 ENTEROCOCCAL SEPSIS (HCC): ICD-10-CM

## 2018-05-17 PROCEDURE — 87040 BLOOD CULTURE FOR BACTERIA: CPT

## 2018-05-17 PROCEDURE — 36415 COLL VENOUS BLD VENIPUNCTURE: CPT

## 2018-05-23 LAB
CULTURE: NORMAL
Lab: NORMAL
Lab: NORMAL
SPECIMEN DESCRIPTION: NORMAL
STATUS: NORMAL
STATUS: NORMAL

## 2018-05-24 PROBLEM — I44.2 CHB (COMPLETE HEART BLOCK) (HCC): Status: ACTIVE | Noted: 2018-05-24

## 2018-06-14 ENCOUNTER — OFFICE VISIT (OUTPATIENT)
Dept: INFECTIOUS DISEASES | Age: 63
End: 2018-06-14
Payer: COMMERCIAL

## 2018-06-14 VITALS
DIASTOLIC BLOOD PRESSURE: 74 MMHG | WEIGHT: 158 LBS | BODY MASS INDEX: 25.39 KG/M2 | HEART RATE: 78 BPM | SYSTOLIC BLOOD PRESSURE: 110 MMHG | RESPIRATION RATE: 14 BRPM | OXYGEN SATURATION: 100 % | HEIGHT: 66 IN

## 2018-06-14 DIAGNOSIS — T82.6XXD PROSTHETIC VALVE ENDOCARDITIS, SUBSEQUENT ENCOUNTER: Primary | ICD-10-CM

## 2018-06-14 DIAGNOSIS — Z98.890 S/P LYMPH NODE BIOPSY: ICD-10-CM

## 2018-06-14 DIAGNOSIS — I38 PROSTHETIC VALVE ENDOCARDITIS, SUBSEQUENT ENCOUNTER: Primary | ICD-10-CM

## 2018-06-14 DIAGNOSIS — C25.9 MALIGNANT NEOPLASM OF PANCREAS, UNSPECIFIED LOCATION OF MALIGNANCY (HCC): ICD-10-CM

## 2018-06-14 PROCEDURE — 3017F COLORECTAL CA SCREEN DOC REV: CPT | Performed by: INTERNAL MEDICINE

## 2018-06-14 PROCEDURE — G8598 ASA/ANTIPLAT THER USED: HCPCS | Performed by: INTERNAL MEDICINE

## 2018-06-14 PROCEDURE — G8419 CALC BMI OUT NRM PARAM NOF/U: HCPCS | Performed by: INTERNAL MEDICINE

## 2018-06-14 PROCEDURE — G8427 DOCREV CUR MEDS BY ELIG CLIN: HCPCS | Performed by: INTERNAL MEDICINE

## 2018-06-14 PROCEDURE — 1036F TOBACCO NON-USER: CPT | Performed by: INTERNAL MEDICINE

## 2018-06-14 PROCEDURE — 99214 OFFICE O/P EST MOD 30 MIN: CPT | Performed by: INTERNAL MEDICINE

## 2018-06-14 ASSESSMENT — ENCOUNTER SYMPTOMS
ABDOMINAL PAIN: 1
RESPIRATORY NEGATIVE: 1

## 2018-07-13 ENCOUNTER — HOSPITAL ENCOUNTER (OUTPATIENT)
Dept: RADIATION ONCOLOGY | Facility: MEDICAL CENTER | Age: 63
Discharge: HOME | End: 2018-07-13

## 2018-07-13 PROCEDURE — 99212 OFFICE O/P EST SF 10 MIN: CPT | Performed by: RADIOLOGY

## 2018-07-17 ENCOUNTER — HOSPITAL ENCOUNTER (OUTPATIENT)
Age: 63
Discharge: HOME | End: 2018-07-17
Payer: COMMERCIAL

## 2018-07-17 LAB
ABSOLUTE EOS #: 0.2 K/UL (ref 0–0.4)
ABSOLUTE IMMATURE GRANULOCYTE: ABNORMAL K/UL (ref 0–0.3)
ABSOLUTE LYMPH #: 1.7 K/UL (ref 1–4.8)
ABSOLUTE MONO #: 0.8 K/UL (ref 0.2–0.8)
ANION GAP SERPL CALCULATED.3IONS-SCNC: 14 MMOL/L (ref 9–17)
BASOPHILS # BLD: 0 % (ref 0–2)
BASOPHILS ABSOLUTE: 0 K/UL (ref 0–0.2)
BUN BLDV-MCNC: 11 MG/DL (ref 8–23)
BUN/CREAT BLD: 10 (ref 9–20)
CALCIUM SERPL-MCNC: 8.7 MG/DL (ref 8.6–10.4)
CHLORIDE BLD-SCNC: 101 MMOL/L (ref 98–107)
CO2: 21 MMOL/L (ref 20–31)
CREAT SERPL-MCNC: 1.1 MG/DL (ref 0.7–1.2)
DIFFERENTIAL TYPE: ABNORMAL
EOSINOPHILS RELATIVE PERCENT: 3 % (ref 1–4)
GFR AFRICAN AMERICAN: >60 ML/MIN
GFR NON-AFRICAN AMERICAN: >60 ML/MIN
GFR SERPL CREATININE-BSD FRML MDRD: ABNORMAL ML/MIN/{1.73_M2}
GFR SERPL CREATININE-BSD FRML MDRD: ABNORMAL ML/MIN/{1.73_M2}
GLUCOSE BLD-MCNC: 100 MG/DL (ref 70–99)
HCT VFR BLD CALC: 34.7 % (ref 41–53)
HEMOGLOBIN: 11.6 G/DL (ref 13.5–17.5)
IMMATURE GRANULOCYTES: ABNORMAL %
IRON SATURATION: 8 % (ref 20–55)
IRON: 30 UG/DL (ref 59–158)
LYMPHOCYTES # BLD: 25 % (ref 24–44)
MCH RBC QN AUTO: 27.2 PG (ref 26–34)
MCHC RBC AUTO-ENTMCNC: 33.3 G/DL (ref 31–37)
MCV RBC AUTO: 81.7 FL (ref 80–100)
MONOCYTES # BLD: 11 % (ref 1–7)
NRBC AUTOMATED: ABNORMAL PER 100 WBC
PDW BLD-RTO: 16.4 % (ref 11.5–14.5)
PLATELET # BLD: 254 K/UL (ref 130–400)
PLATELET ESTIMATE: ABNORMAL
PMV BLD AUTO: ABNORMAL FL (ref 6–12)
POTASSIUM SERPL-SCNC: 3.8 MMOL/L (ref 3.7–5.3)
RBC # BLD: 4.25 M/UL (ref 4.5–5.9)
RBC # BLD: ABNORMAL 10*6/UL
SEG NEUTROPHILS: 61 % (ref 36–66)
SEGMENTED NEUTROPHILS ABSOLUTE COUNT: 4.2 K/UL (ref 1.8–7.7)
SODIUM BLD-SCNC: 136 MMOL/L (ref 135–144)
TOTAL IRON BINDING CAPACITY: 358 UG/DL (ref 250–450)
TSH SERPL DL<=0.05 MIU/L-ACNC: 0.94 MIU/L (ref 0.3–5)
UNSATURATED IRON BINDING CAPACITY: 328 UG/DL (ref 112–347)
WBC # BLD: 6.9 K/UL (ref 3.5–11)
WBC # BLD: ABNORMAL 10*3/UL

## 2018-07-17 PROCEDURE — 83540 ASSAY OF IRON: CPT

## 2018-07-17 PROCEDURE — 84443 ASSAY THYROID STIM HORMONE: CPT

## 2018-07-17 PROCEDURE — 85025 COMPLETE CBC W/AUTO DIFF WBC: CPT

## 2018-07-17 PROCEDURE — 80048 BASIC METABOLIC PNL TOTAL CA: CPT

## 2018-07-17 PROCEDURE — 36415 COLL VENOUS BLD VENIPUNCTURE: CPT

## 2018-07-17 PROCEDURE — 83550 IRON BINDING TEST: CPT

## 2018-08-22 NOTE — PROGRESS NOTES
[facility]  Office Progress Note  Today's Date and Time: 8/22/2018, 11:15 AM    Impression:     1. Prosthetic valve endocarditis, subsequent encounter    2. Malignant neoplasm of pancreas, unspecified location of malignancy ([facility] [address]         Recommendations   · The patient will continue on antimicrobial therapy with amoxicillin 500 mg twice a day as an oral suppressive regimen for the enterococcal prosthetic valve endocarditis. · The patient continues to tolerate the medication well and I did advocate for probiotics. · The wife did aspirate is okay for them to travel to visit family and I am not opposed to this. · He'll follow-up with me in 3-4 months to assess his progress. I have ordered the following medications/ labs:  No orders of the defined types were placed in this encounter. 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 He completed 6 weeks of intravenous antimicrobial therapy with Unasyn on May 4, 2018. Follow-up blood cultures were negative. He did have a follow-up MATTHIAS that showed a decreased aortic valve vegetation noted, he also had the permanent pacemaker removed as he was not pacer dependent. He is taking the amoxicillin for oral suppressive therapy and tolerating it very well. He has a history of pancreatic cancer, and abnormal PET scan with left lateral chest lesions for which he underwent lymph node biopsy by Dr. Shahla Denise which was complicated by a postoperative hematoma requiring percutaneous AMANDA drains.  The wounds of the left chest have healed very well and there was no malignancy identified. Since I last saw Shania Cintron he is actually doing very well he has gained weight and appetite is very good. He has not had any fevers or chills and he continues to get better with his activities of daily living. He continues to follow with his other physicians and he has had a stable CA 19-9 and a CAT scan of the abdomen and pelvis done 7/26/18 which did not show any convincing evidence of relapse or recurrent malignancy. He continues off adjuvant chemotherapy with close observation and watchful waiting. He does not report any fevers or chills. No cough or short of breath. No chest pains or palpitations. He is still weak and is using a cane to help with walking. I have personally reviewed the past medical history, medications, social history, and I have updated the database accordingly.   Past Medical History:     Past Medical History:   Diagnosis Date    Acid reflux     Arthritis     CAD (coronary artery disease)     Endocarditis     Enterococcal sepsis (HCC)     Pancreatic carcinoma (HCC)     Positive cardiac stress test     Prostate cancer (HCC)     Elevated PSA     Prosthetic valve endocarditis, subsequent encounter     Unspecified cerebral artery occlusion with cerebral infarction     TIA     Medications:     Current Outpatient Prescriptions   Medication Sig Dispense Refill    amoxicillin (AMOXIL) 500 MG capsule Take 1 capsule by mouth 2 times daily 60 capsule 0    CALCIUM ASCORBATE PO Take by mouth      pantoprazole (PROTONIX) 40 MG tablet       acetaminophen (TYLENOL) 500 MG tablet Take 650 mg by mouth every 8 hours as needed for Pain      warfarin (COUMADIN) 5 MG tablet Take 5 mg by mouth daily Indications: TAKES 1 OR 2 TABS DEPENDING ON INR i tab M/TH/F SAT/SUN  1 1/5 TU/ WED      Calcium Carb-Cholecalciferol (OYSTER SHELL CALCIUM/VITAMIN D) 500-200 MG-UNIT TABS Take 1 tablet by mouth daily      Multiple Vitamins-Minerals (THERAPEUTIC MULTIVITAMIN-MINERALS) tablet Take 1 tablet by mouth daily. No current facility-administered medications for this visit. Allergies:   Patient has no known allergies. Review of Systems:   Review of Systems   Constitutional: Negative. HENT: Negative. Eyes: Negative. Respiratory: Negative. Cardiovascular: Negative. Gastrointestinal: Negative. Endocrine: Negative. Genitourinary: Negative. Musculoskeletal: Negative. Skin: Negative. Allergic/Immunologic: Negative. Neurological: Negative. Hematological: Negative. Psychiatric/Behavioral: Negative. Physical Examination :   /79 (Site: Right Arm)   Pulse 56   Temp 96.9 °F (36.1 °C)   Ht 5' 9\" (1.753 m)   Wt 164 lb (74.4 kg)   BMI 24.22 kg/m²     Physical Exam   Constitutional: He is oriented to person, place, and time and well-developed, well-nourished, and in no distress. HENT:   Head: Normocephalic and atraumatic. Mouth/Throat: Oropharynx is clear and moist.   Eyes: Pupils are equal, round, and reactive to light. EOM are normal. No scleral icterus. Neck: Normal range of motion. Neck supple. Cardiovascular: Normal rate and regular rhythm. Murmur (Metallic click appreciated) heard. Pulmonary/Chest: Effort normal and breath sounds normal.   Abdominal: Soft. Bowel sounds are normal. He exhibits no mass. There is no tenderness. The surgical incisions are all well-healed   Musculoskeletal: Normal range of motion. He exhibits no edema. Lymphadenopathy:     He has no cervical adenopathy. Neurological: He is alert and oriented to person, place, and time. Skin: Skin is warm and dry. No rash noted.        Laboratory studies :  Medical Decision Making:   I have independently reviewed the following labs:  CBC with Differential:   Lab Results   Component Value Date    WBC 6.9 07/17/2018    WBC 8.0 11/30/2017    HGB 11.6 07/17/2018    HGB 13.3 11/30/2017    HCT 34.7 07/17/2018    HCT 42.7

## 2018-09-25 NOTE — ED PROVIDER NOTES
[address] [place] [address] ED  eMERGENCY dEPARTMENT eNCOUnter      Pt Name: Kristy Johnson  MRN: [id number]  [place] 1955  Date of evaluation: 9/25/2018  Provider: BRENDA Sheppard CNP    CHIEF COMPLAINT       Chief Complaint   Patient presents with    Other     bat in house 5wks ago (pt denies having been bitten)         HISTORY OF PRESENT ILLNESS  (Location/Symptom, Timing/Onset, Context/Setting, Quality, Duration, Modifying Factors, Severity.)   Kristy Johnson is a 58 y.o. male who presents to the emergency department via private auto after contact with a bat. They have had several bat sightings in the house over the past 5 weeks. He does not remember any bites or scratches. He does not have any wounds. No physical complaints. Nursing Notes were reviewed. ALLERGIES     Patient has no known allergies. CURRENT MEDICATIONS       Discharge Medication List as of 9/25/2018  6:20 PM      CONTINUE these medications which have NOT CHANGED    Details   amoxicillin (AMOXIL) 500 MG capsule Take 500 mg by mouth 2 times daily Pt has been on this 1 yr s/p heart surgery(stated 9/25/18)Historical Med      ferrous sulfate 325 (65 Fe) MG tablet Take 325 mg by mouth daily (with breakfast)Historical Med      CALCIUM ASCORBATE PO Take by mouthHistorical Med      pantoprazole (PROTONIX) 40 MG tablet Historical Med      acetaminophen (TYLENOL) 500 MG tablet Take 650 mg by mouth every 8 hours as needed for PainHistorical Med      warfarin (COUMADIN) 5 MG tablet Take 5 mg by mouth daily Indications: TAKES 1 OR 2 TABS DEPENDING ON INR i tab M/TH/F SAT/SUN  1 1/5 TU/ WED      Calcium Carb-Cholecalciferol (OYSTER SHELL CALCIUM/VITAMIN D) 500-200 MG-UNIT TABS Take 1 tablet by mouth daily      Multiple Vitamins-Minerals (THERAPEUTIC MULTIVITAMIN-MINERALS) tablet Take 1 tablet by mouth daily.              PAST MEDICAL HISTORY         Diagnosis Date    Acid reflux     Arthritis     CAD (coronary artery disease)     Endocarditis     and dry. Erythema, ecchymosis or abrasions         DIAGNOSTIC RESULTS     EKG: All EKG's are interpreted by the Emergency Department Physician who either signs or Co-signs this chart in the absence of a cardiologist.    RADIOLOGY:   Non-plain film images such as CT, Ultrasound and MRI are read by the radiologist. Plain radiographic images are visualized and preliminarily interpreted by the emergency physician with the below findings:    Interpretation per the Radiologist below, if available at the time of this note:    No orders to display         LABS:  Labs Reviewed - No data to display    All other labs were within normal range or not returned as of this dictation. EMERGENCY DEPARTMENT COURSE and DIFFERENTIAL DIAGNOSIS/MDM:   Vitals:    Vitals:    18 1643   BP: 122/68   Pulse: 62   Resp: 18   Temp: 97.8 °F (36.6 °C)   SpO2: 100%   Weight: 167 lb (75.8 kg)   Height: 5' 9\" (1.753 m)       Medical Decision Makin-year-old male who is afebrile and does not appear ill. He has not had any physical contact with the bat and has no wounds or abrasions. He will follow up with his primary care provider as needed. Vaccines are not indicated. FINAL IMPRESSION      1. Contact with or exposure to rabies          DISPOSITION/PLAN   DISPOSITION Decision To Discharge 2018 06:08:01 PM      PATIENT REFERRED TO:   Ronan Hernandez MD  [address] [place] [address]  [address]  [address]  [phone]      As needed      DISCHARGE MEDICATIONS:     Discharge Medication List as of 2018  6:20 PM          (Please note that portions of this note were completed with a voice recognition program.  Efforts were made to edit the dictations but occasionally words are mis-transcribed. )    ROBB WASHINGTON, APRN - CNP              Vicki Johnston, BRENDA - BROWN  [phone]

## 2019-01-01 ENCOUNTER — APPOINTMENT (OUTPATIENT)
Dept: GENERAL RADIOLOGY | Age: 64
DRG: 074 | End: 2019-01-01
Payer: MEDICARE

## 2019-01-01 ENCOUNTER — HOSPITAL ENCOUNTER (INPATIENT)
Age: 64
LOS: 15 days | Discharge: HOME | DRG: 853 | End: 2019-07-25
Attending: EMERGENCY MEDICINE | Admitting: INTERNAL MEDICINE
Payer: MEDICARE

## 2019-01-01 ENCOUNTER — APPOINTMENT (OUTPATIENT)
Dept: ULTRASOUND IMAGING | Age: 64
DRG: 853 | End: 2019-01-01
Payer: MEDICARE

## 2019-01-01 ENCOUNTER — ANESTHESIA (OUTPATIENT)
Dept: OPERATING ROOM | Age: 64
DRG: 853 | End: 2019-01-01
Payer: MEDICARE

## 2019-01-01 ENCOUNTER — HOSPITAL ENCOUNTER (OUTPATIENT)
Age: 64
Discharge: HOME | End: 2019-03-26
Payer: COMMERCIAL

## 2019-01-01 ENCOUNTER — APPOINTMENT (OUTPATIENT)
Dept: INTERVENTIONAL RADIOLOGY/VASCULAR | Age: 64
DRG: 853 | End: 2019-01-01
Payer: MEDICARE

## 2019-01-01 ENCOUNTER — APPOINTMENT (OUTPATIENT)
Dept: CT IMAGING | Age: 64
DRG: 074 | End: 2019-01-01
Payer: MEDICARE

## 2019-01-01 ENCOUNTER — HOSPITAL ENCOUNTER (INPATIENT)
Age: 64
LOS: 1 days | Discharge: HOSPICE | DRG: 074 | End: 2019-07-31
Attending: EMERGENCY MEDICINE | Admitting: INTERNAL MEDICINE
Payer: MEDICARE

## 2019-01-01 ENCOUNTER — HOSPITAL ENCOUNTER (OUTPATIENT)
Dept: RADIATION ONCOLOGY | Facility: MEDICAL CENTER | Age: 64
Setting detail: THERAPIES SERIES
Discharge: HOME | End: 2019-01-17
Payer: COMMERCIAL

## 2019-01-01 ENCOUNTER — OFFICE VISIT (OUTPATIENT)
Dept: INFECTIOUS DISEASES | Age: 64
End: 2019-01-01
Payer: COMMERCIAL

## 2019-01-01 ENCOUNTER — HOSPITAL ENCOUNTER (OUTPATIENT)
Age: 64
Discharge: HOME | End: 2019-02-04
Payer: COMMERCIAL

## 2019-01-01 ENCOUNTER — HOSPITAL ENCOUNTER (OUTPATIENT)
Age: 64
Discharge: HOME | End: 2019-03-25
Payer: COMMERCIAL

## 2019-01-01 ENCOUNTER — HOSPITAL ENCOUNTER (OUTPATIENT)
Age: 64
Discharge: HOME | End: 2019-01-24
Payer: COMMERCIAL

## 2019-01-01 ENCOUNTER — ANESTHESIA EVENT (OUTPATIENT)
Dept: OPERATING ROOM | Age: 64
DRG: 853 | End: 2019-01-01
Payer: MEDICARE

## 2019-01-01 ENCOUNTER — APPOINTMENT (OUTPATIENT)
Dept: GENERAL RADIOLOGY | Age: 64
DRG: 853 | End: 2019-01-01
Payer: MEDICARE

## 2019-01-01 ENCOUNTER — APPOINTMENT (OUTPATIENT)
Dept: CT IMAGING | Age: 64
DRG: 853 | End: 2019-01-01
Payer: MEDICARE

## 2019-01-01 VITALS
DIASTOLIC BLOOD PRESSURE: 63 MMHG | HEART RATE: 90 BPM | RESPIRATION RATE: 18 BRPM | SYSTOLIC BLOOD PRESSURE: 100 MMHG | BODY MASS INDEX: 26.15 KG/M2 | TEMPERATURE: 98.2 F | HEIGHT: 67 IN | OXYGEN SATURATION: 99 % | WEIGHT: 166.6 LBS

## 2019-01-01 VITALS
WEIGHT: 167.3 LBS | TEMPERATURE: 98.3 F | DIASTOLIC BLOOD PRESSURE: 66 MMHG | OXYGEN SATURATION: 95 % | BODY MASS INDEX: 24.78 KG/M2 | RESPIRATION RATE: 17 BRPM | HEART RATE: 88 BPM | SYSTOLIC BLOOD PRESSURE: 101 MMHG | HEIGHT: 69 IN

## 2019-01-01 VITALS
RESPIRATION RATE: 14 BRPM | SYSTOLIC BLOOD PRESSURE: 112 MMHG | HEART RATE: 63 BPM | OXYGEN SATURATION: 99 % | WEIGHT: 160 LBS | BODY MASS INDEX: 23.63 KG/M2 | DIASTOLIC BLOOD PRESSURE: 71 MMHG | TEMPERATURE: 97.2 F

## 2019-01-01 VITALS — DIASTOLIC BLOOD PRESSURE: 58 MMHG | OXYGEN SATURATION: 99 % | SYSTOLIC BLOOD PRESSURE: 90 MMHG

## 2019-01-01 VITALS
DIASTOLIC BLOOD PRESSURE: 84 MMHG | WEIGHT: 168.8 LBS | OXYGEN SATURATION: 100 % | HEART RATE: 86 BPM | SYSTOLIC BLOOD PRESSURE: 155 MMHG | TEMPERATURE: 97.5 F | RESPIRATION RATE: 16 BRPM | BODY MASS INDEX: 24.93 KG/M2

## 2019-01-01 VITALS
DIASTOLIC BLOOD PRESSURE: 71 MMHG | SYSTOLIC BLOOD PRESSURE: 101 MMHG | RESPIRATION RATE: 20 BRPM | OXYGEN SATURATION: 100 %

## 2019-01-01 DIAGNOSIS — R26.2 UNABLE TO AMBULATE: ICD-10-CM

## 2019-01-01 DIAGNOSIS — R31.0 GROSS HEMATURIA: Primary | ICD-10-CM

## 2019-01-01 DIAGNOSIS — I38 PROSTHETIC VALVE ENDOCARDITIS, SUBSEQUENT ENCOUNTER: Primary | ICD-10-CM

## 2019-01-01 DIAGNOSIS — C25.9 MALIGNANT NEOPLASM OF PANCREAS, UNSPECIFIED LOCATION OF MALIGNANCY (HCC): ICD-10-CM

## 2019-01-01 DIAGNOSIS — C61 ADENOCARCINOMA OF PROSTATE (HCC): Primary | ICD-10-CM

## 2019-01-01 DIAGNOSIS — C25.0 CARCINOMA OF HEAD OF PANCREAS (HCC): ICD-10-CM

## 2019-01-01 DIAGNOSIS — N30.01 ACUTE CYSTITIS WITH HEMATURIA: ICD-10-CM

## 2019-01-01 DIAGNOSIS — R50.9 INTERMITTENT FEVER: ICD-10-CM

## 2019-01-01 DIAGNOSIS — M79.604 RIGHT LEG PAIN: Primary | ICD-10-CM

## 2019-01-01 DIAGNOSIS — I95.0 IDIOPATHIC HYPOTENSION: ICD-10-CM

## 2019-01-01 DIAGNOSIS — K76.9 LESION OF LIVER: ICD-10-CM

## 2019-01-01 DIAGNOSIS — R30.0 DYSURIA: ICD-10-CM

## 2019-01-01 DIAGNOSIS — R53.1 GENERAL WEAKNESS: ICD-10-CM

## 2019-01-01 DIAGNOSIS — T82.6XXD PROSTHETIC VALVE ENDOCARDITIS, SUBSEQUENT ENCOUNTER: Primary | ICD-10-CM

## 2019-01-01 LAB
-: ABNORMAL
-: NORMAL
ABO/RH: NORMAL
ABO/RH: NORMAL
ABSOLUTE EOS #: 0.19 K/UL (ref 0–0.4)
ABSOLUTE EOS #: 0.2 K/UL (ref 0–0.44)
ABSOLUTE EOS #: 0.22 K/UL (ref 0–0.4)
ABSOLUTE EOS #: 0.27 K/UL (ref 0–0.4)
ABSOLUTE EOS #: 0.3 K/UL (ref 0–0.4)
ABSOLUTE EOS #: 0.49 K/UL (ref 0–0.4)
ABSOLUTE IMMATURE GRANULOCYTE: 0 K/UL (ref 0–0.3)
ABSOLUTE IMMATURE GRANULOCYTE: 0.22 K/UL (ref 0–0.3)
ABSOLUTE IMMATURE GRANULOCYTE: 0.49 K/UL (ref 0–0.3)
ABSOLUTE IMMATURE GRANULOCYTE: 0.54 K/UL (ref 0–0.3)
ABSOLUTE IMMATURE GRANULOCYTE: 0.89 K/UL (ref 0–0.3)
ABSOLUTE IMMATURE GRANULOCYTE: <0.03 K/UL (ref 0–0.3)
ABSOLUTE LYMPH #: 1.13 K/UL (ref 1–4.8)
ABSOLUTE LYMPH #: 1.33 K/UL (ref 1–4.8)
ABSOLUTE LYMPH #: 1.36 K/UL (ref 1–4.8)
ABSOLUTE LYMPH #: 1.41 K/UL (ref 1.1–3.7)
ABSOLUTE LYMPH #: 1.48 K/UL (ref 1–4.8)
ABSOLUTE LYMPH #: 1.49 K/UL (ref 1–4.8)
ABSOLUTE MONO #: 0.77 K/UL (ref 0.1–1.2)
ABSOLUTE MONO #: 1.5 K/UL (ref 0.2–0.8)
ABSOLUTE MONO #: 1.98 K/UL (ref 0.1–0.8)
ABSOLUTE MONO #: 2.21 K/UL (ref 0.2–0.8)
ABSOLUTE MONO #: 2.38 K/UL (ref 0.2–0.8)
ABSOLUTE MONO #: 2.44 K/UL (ref 0.2–0.8)
ABSOLUTE RETIC #: 0.28 M/UL (ref 0.03–0.08)
ALBUMIN SERPL-MCNC: 2.9 G/DL (ref 3.5–5.2)
ALBUMIN SERPL-MCNC: 3.3 G/DL (ref 3.5–5.2)
ALBUMIN SERPL-MCNC: 3.4 G/DL (ref 3.5–5.2)
ALBUMIN SERPL-MCNC: 3.5 G/DL (ref 3.5–5.2)
ALBUMIN SERPL-MCNC: 4 G/DL (ref 3.5–5.2)
ALBUMIN/GLOBULIN RATIO: 1.3 (ref 1–2.5)
ALBUMIN/GLOBULIN RATIO: ABNORMAL (ref 1–2.5)
ALP BLD-CCNC: 103 U/L (ref 40–129)
ALP BLD-CCNC: 282 U/L (ref 40–129)
ALP BLD-CCNC: 286 U/L (ref 40–129)
ALP BLD-CCNC: 369 U/L (ref 40–129)
ALT SERPL-CCNC: 44 U/L (ref 5–41)
ALT SERPL-CCNC: 46 U/L (ref 5–41)
ALT SERPL-CCNC: 47 U/L (ref 5–41)
ALT SERPL-CCNC: 63 U/L (ref 5–41)
AMMONIA: 107 UMOL/L (ref 16–60)
AMMONIA: 65 UMOL/L (ref 16–60)
AMMONIA: 67 UMOL/L (ref 16–60)
AMMONIA: 74 UMOL/L (ref 16–60)
AMORPHOUS: ABNORMAL
AMORPHOUS: NORMAL
ANION GAP SERPL CALCULATED.3IONS-SCNC: 13 MMOL/L (ref 9–17)
ANION GAP SERPL CALCULATED.3IONS-SCNC: 14 MMOL/L
ANION GAP SERPL CALCULATED.3IONS-SCNC: 14 MMOL/L (ref 9–17)
ANION GAP SERPL CALCULATED.3IONS-SCNC: 15 MMOL/L (ref 9–17)
ANION GAP SERPL CALCULATED.3IONS-SCNC: 16 MMOL/L (ref 9–17)
ANION GAP SERPL CALCULATED.3IONS-SCNC: 17 MMOL/L (ref 9–17)
ANTIBODY SCREEN: NEGATIVE
ANTIBODY SCREEN: NEGATIVE
APPEARANCE FLUID: NORMAL
ARM BAND NUMBER: NORMAL
ARM BAND NUMBER: NORMAL
AST SERPL-CCNC: 109 U/L
AST SERPL-CCNC: 114 U/L
AST SERPL-CCNC: 116 U/L
AST SERPL-CCNC: 72 U/L
BACTERIA: ABNORMAL
BACTERIA: NORMAL
BASO FLUID: NORMAL %
BASOPHILS # BLD: 0 %
BASOPHILS # BLD: 0 % (ref 0–2)
BASOPHILS # BLD: 1 %
BASOPHILS # BLD: 1 % (ref 0–2)
BASOPHILS ABSOLUTE: 0 K/UL (ref 0–0.2)
BASOPHILS ABSOLUTE: 0.06 K/UL (ref 0–0.2)
BASOPHILS ABSOLUTE: 0.22 K/UL (ref 0–0.2)
BILIRUB SERPL-MCNC: 0.65 MG/DL (ref 0.3–1.2)
BILIRUB SERPL-MCNC: 1.09 MG/DL (ref 0.3–1.2)
BILIRUB SERPL-MCNC: 1.31 MG/DL (ref 0.3–1.2)
BILIRUB SERPL-MCNC: 2.62 MG/DL (ref 0.3–1.2)
BILIRUBIN DIRECT: 0.61 MG/DL
BILIRUBIN DIRECT: 0.64 MG/DL
BILIRUBIN DIRECT: 1.5 MG/DL
BILIRUBIN URINE: ABNORMAL
BILIRUBIN URINE: NEGATIVE
BILIRUBIN, INDIRECT: 0.48 MG/DL (ref 0–1)
BILIRUBIN, INDIRECT: 0.67 MG/DL (ref 0–1)
BILIRUBIN, INDIRECT: 1.12 MG/DL (ref 0–1)
BLD PROD TYP BPU: NORMAL
BUN BLDV-MCNC: 12 MG/DL (ref 8–23)
BUN BLDV-MCNC: 23 MG/DL (ref 8–23)
BUN BLDV-MCNC: 31 MG/DL (ref 8–23)
BUN BLDV-MCNC: 33 MG/DL (ref 8–23)
BUN BLDV-MCNC: 34 MG/DL (ref 8–23)
BUN BLDV-MCNC: 35 MG/DL (ref 8–23)
BUN/CREAT BLD: 24 (ref 9–20)
BUN/CREAT BLD: 26 (ref 9–20)
BUN/CREAT BLD: 27 (ref 9–20)
BUN/CREAT BLD: 33 (ref 9–20)
BUN/CREAT BLD: 34 (ref 9–20)
BUN/CREAT BLD: 34 (ref 9–20)
BUN/CREAT BLD: 35 (ref 9–20)
BUN/CREAT BLD: ABNORMAL (ref 9–20)
CA 19-9: 52 U/ML (ref 0–35)
CA 19-9: 753 U/ML (ref 0–35)
CALCIUM SERPL-MCNC: 7.6 MG/DL (ref 8.6–10.4)
CALCIUM SERPL-MCNC: 7.8 MG/DL (ref 8.6–10.4)
CALCIUM SERPL-MCNC: 8 MG/DL (ref 8.6–10.4)
CALCIUM SERPL-MCNC: 8.1 MG/DL (ref 8.6–10.4)
CALCIUM SERPL-MCNC: 8.2 MG/DL (ref 8.6–10.4)
CALCIUM SERPL-MCNC: 8.3 MG/DL (ref 8.6–10.4)
CALCIUM SERPL-MCNC: 8.7 MG/DL (ref 8.6–10.4)
CALCIUM SERPL-MCNC: 8.9 MG/DL (ref 8.6–10.4)
CASE NUMBER:: NORMAL
CASTS UA: ABNORMAL /LPF
CASTS UA: NORMAL /LPF
CHLORIDE BLD-SCNC: 100 MMOL/L (ref 98–107)
CHLORIDE BLD-SCNC: 101 MMOL/L (ref 98–107)
CHLORIDE BLD-SCNC: 102 MMOL/L (ref 98–107)
CHLORIDE BLD-SCNC: 102 MMOL/L (ref 98–107)
CHLORIDE BLD-SCNC: 107 MMOL/L (ref 98–107)
CHLORIDE BLD-SCNC: 99 MMOL/L (ref 98–107)
CO2: 18 MMOL/L (ref 20–31)
CO2: 18 MMOL/L (ref 20–31)
CO2: 19 MMOL/L (ref 20–31)
CO2: 20 MMOL/L (ref 20–31)
CO2: 20 MMOL/L (ref 20–31)
CO2: 22 MMOL/L (ref 20–31)
CO2: 22 MMOL/L (ref 20–31)
CO2: 24 MMOL/L (ref 20–31)
COLOR FLUID: NORMAL
COLOR: ABNORMAL
COLOR: YELLOW
COMMENT UA: ABNORMAL
CREAT SERPL-MCNC: 0.96 MG/DL (ref 0.7–1.2)
CREAT SERPL-MCNC: 0.96 MG/DL (ref 0.7–1.2)
CREAT SERPL-MCNC: 0.99 MG/DL (ref 0.7–1.2)
CREAT SERPL-MCNC: 1.02 MG/DL (ref 0.7–1.2)
CREAT SERPL-MCNC: 1.21 MG/DL (ref 0.7–1.2)
CREAT SERPL-MCNC: 1.22 MG/DL (ref 0.7–1.2)
CROSSMATCH RESULT: NORMAL
CRYSTALS, UA: ABNORMAL /HPF
CRYSTALS, UA: NORMAL /HPF
CULTURE: ABNORMAL
CULTURE: NO GROWTH
CULTURE: NORMAL
D-DIMER QUANTITATIVE: 30.16 MG/L FEU
D-DIMER QUANTITATIVE: 35.2 MG/L FEU
DIFFERENTIAL TYPE: ABNORMAL
DIRECT EXAM: ABNORMAL
DISPENSE STATUS BLOOD BANK: NORMAL
EOSINOPHIL FLUID: NORMAL %
EOSINOPHILS RELATIVE PERCENT: 1 % (ref 1–4)
EOSINOPHILS RELATIVE PERCENT: 2 % (ref 1–4)
EOSINOPHILS RELATIVE PERCENT: 3 % (ref 1–4)
EPITHELIAL CELLS UA: ABNORMAL /HPF (ref 0–5)
EPITHELIAL CELLS UA: NORMAL /HPF (ref 0–5)
EXPIRATION DATE: NORMAL
EXPIRATION DATE: NORMAL
FACTOR VIII ACTIVITY: 233 % (ref 50–150)
FERRITIN: 667 UG/L (ref 30–400)
FIBRINOGEN: 74 MG/DL (ref 170–400)
FIBRINOGEN: 85 MG/DL (ref 170–400)
FLUID DIFF COMMENT: NORMAL
GFR AFRICAN AMERICAN: >60 ML/MIN
GFR NON-AFRICAN AMERICAN: 60 ML/MIN
GFR NON-AFRICAN AMERICAN: >60 ML/MIN
GFR SERPL CREATININE-BSD FRML MDRD: ABNORMAL ML/MIN/{1.73_M2}
GLOBULIN: ABNORMAL G/DL (ref 1.5–3.8)
GLUCOSE BLD-MCNC: 104 MG/DL (ref 70–99)
GLUCOSE BLD-MCNC: 105 MG/DL (ref 70–99)
GLUCOSE BLD-MCNC: 109 MG/DL (ref 70–99)
GLUCOSE BLD-MCNC: 129 MG/DL (ref 70–99)
GLUCOSE BLD-MCNC: 89 MG/DL (ref 70–99)
GLUCOSE BLD-MCNC: 90 MG/DL (ref 70–99)
GLUCOSE BLD-MCNC: 90 MG/DL (ref 70–99)
GLUCOSE BLD-MCNC: 93 MG/DL (ref 70–99)
GLUCOSE URINE: ABNORMAL
GLUCOSE URINE: NEGATIVE
HAPTOGLOBIN: <10 MG/DL (ref 30–200)
HCT VFR BLD CALC: 21.7 % (ref 40.7–50.3)
HCT VFR BLD CALC: 23.9 % (ref 40.7–50.3)
HCT VFR BLD CALC: 24.6 % (ref 40.7–50.3)
HCT VFR BLD CALC: 24.7 % (ref 40.7–50.3)
HCT VFR BLD CALC: 24.9 % (ref 40.7–50.3)
HCT VFR BLD CALC: 25.1 % (ref 40.7–50.3)
HCT VFR BLD CALC: 25.5 % (ref 40.7–50.3)
HCT VFR BLD CALC: 25.6 % (ref 40.7–50.3)
HCT VFR BLD CALC: 25.6 % (ref 40.7–50.3)
HCT VFR BLD CALC: 25.8 % (ref 40.7–50.3)
HCT VFR BLD CALC: 26 % (ref 40.7–50.3)
HCT VFR BLD CALC: 26.1 % (ref 40.7–50.3)
HCT VFR BLD CALC: 26.5 % (ref 40.7–50.3)
HCT VFR BLD CALC: 26.5 % (ref 40.7–50.3)
HCT VFR BLD CALC: 26.7 % (ref 40.7–50.3)
HCT VFR BLD CALC: 26.8 % (ref 40.7–50.3)
HCT VFR BLD CALC: 27 % (ref 40.7–50.3)
HCT VFR BLD CALC: 27 % (ref 40.7–50.3)
HCT VFR BLD CALC: 27.3 % (ref 40.7–50.3)
HCT VFR BLD CALC: 27.3 % (ref 40.7–50.3)
HCT VFR BLD CALC: 27.4 % (ref 40.7–50.3)
HCT VFR BLD CALC: 27.7 % (ref 40.7–50.3)
HCT VFR BLD CALC: 28 % (ref 40.7–50.3)
HCT VFR BLD CALC: 28.1 % (ref 40.7–50.3)
HCT VFR BLD CALC: 28.3 % (ref 40.7–50.3)
HCT VFR BLD CALC: 28.3 % (ref 40.7–50.3)
HCT VFR BLD CALC: 28.4 % (ref 40.7–50.3)
HCT VFR BLD CALC: 28.5 % (ref 40.7–50.3)
HCT VFR BLD CALC: 28.6 % (ref 40.7–50.3)
HCT VFR BLD CALC: 28.7 % (ref 40.7–50.3)
HCT VFR BLD CALC: 28.8 % (ref 40.7–50.3)
HCT VFR BLD CALC: 28.9 % (ref 40.7–50.3)
HCT VFR BLD CALC: 29.2 % (ref 40.7–50.3)
HCT VFR BLD CALC: 29.4 % (ref 40.7–50.3)
HCT VFR BLD CALC: 29.4 % (ref 40.7–50.3)
HCT VFR BLD CALC: 29.8 % (ref 40.7–50.3)
HCT VFR BLD CALC: 29.8 % (ref 40.7–50.3)
HCT VFR BLD CALC: 30.6 % (ref 40.7–50.3)
HCT VFR BLD CALC: 30.9 % (ref 40.7–50.3)
HCT VFR BLD CALC: 31.1 % (ref 40.7–50.3)
HCT VFR BLD CALC: 31.1 % (ref 40.7–50.3)
HCT VFR BLD CALC: 31.5 % (ref 40.7–50.3)
HCT VFR BLD CALC: 31.6 % (ref 40.7–50.3)
HCT VFR BLD CALC: 40.6 % (ref 40.7–50.3)
HEMOGLOBIN: 10 G/DL (ref 13–17)
HEMOGLOBIN: 10.1 G/DL (ref 13–17)
HEMOGLOBIN: 12.8 G/DL (ref 13–17)
HEMOGLOBIN: 6.8 G/DL (ref 13–17)
HEMOGLOBIN: 7.6 G/DL (ref 13–17)
HEMOGLOBIN: 7.6 G/DL (ref 13–17)
HEMOGLOBIN: 7.7 G/DL (ref 13–17)
HEMOGLOBIN: 7.8 G/DL (ref 13–17)
HEMOGLOBIN: 7.9 G/DL (ref 13–17)
HEMOGLOBIN: 8 G/DL (ref 13–17)
HEMOGLOBIN: 8 G/DL (ref 13–17)
HEMOGLOBIN: 8.1 G/DL (ref 13–17)
HEMOGLOBIN: 8.3 G/DL (ref 13–17)
HEMOGLOBIN: 8.3 G/DL (ref 13–17)
HEMOGLOBIN: 8.4 G/DL (ref 13–17)
HEMOGLOBIN: 8.5 G/DL (ref 13–17)
HEMOGLOBIN: 8.7 G/DL (ref 13–17)
HEMOGLOBIN: 8.7 G/DL (ref 13–17)
HEMOGLOBIN: 8.8 G/DL (ref 13–17)
HEMOGLOBIN: 8.8 G/DL (ref 13–17)
HEMOGLOBIN: 8.9 G/DL (ref 13–17)
HEMOGLOBIN: 9 G/DL (ref 13–17)
HEMOGLOBIN: 9.1 G/DL (ref 13–17)
HEMOGLOBIN: 9.2 G/DL (ref 13–17)
HEMOGLOBIN: 9.2 G/DL (ref 13–17)
HEMOGLOBIN: 9.3 G/DL (ref 13–17)
HEMOGLOBIN: 9.4 G/DL (ref 13–17)
HEMOGLOBIN: 9.4 G/DL (ref 13–17)
HEMOGLOBIN: 9.5 G/DL (ref 13–17)
HEMOGLOBIN: 9.6 G/DL (ref 13–17)
HEMOGLOBIN: 9.6 G/DL (ref 13–17)
HEMOGLOBIN: 9.7 G/DL (ref 13–17)
HEMOGLOBIN: 9.7 G/DL (ref 13–17)
HEMOGLOBIN: 9.8 G/DL (ref 13–17)
IMMATURE GRANULOCYTES: 0 %
IMMATURE GRANULOCYTES: 0 %
IMMATURE GRANULOCYTES: 1 %
IMMATURE GRANULOCYTES: 2 %
IMMATURE GRANULOCYTES: 2 %
IMMATURE GRANULOCYTES: 3 %
IMMATURE RETIC FRACT: 34.6 % (ref 2.7–18.3)
INR BLD: 1.5
INR BLD: 1.6
INR BLD: 1.7
INR BLD: 1.7
INR BLD: 1.8
INR BLD: 1.8
INR BLD: 1.9
INR BLD: 2
INR BLD: 2.1
INR BLD: 2.2
INR BLD: 2.4
INR BLD: 4.9
INR BLD: 5.3
IRON SATURATION: 26 % (ref 20–55)
IRON: 52 UG/DL (ref 59–158)
IRON: 77 UG/DL (ref 59–158)
KETONES, URINE: ABNORMAL
KETONES, URINE: ABNORMAL
KETONES, URINE: NEGATIVE
LACTIC ACID: 1.4 MMOL/L (ref 0.5–2.2)
LACTIC ACID: 2 MMOL/L (ref 0.5–2.2)
LACTIC ACID: 2.1 MMOL/L (ref 0.5–2.2)
LACTIC ACID: 2.3 MMOL/L (ref 0.5–2.2)
LACTIC ACID: 3.3 MMOL/L (ref 0.5–2.2)
LEUKOCYTE ESTERASE, URINE: ABNORMAL
LEUKOCYTE ESTERASE, URINE: NEGATIVE
LYMPHOCYTES # BLD: 21 % (ref 24–43)
LYMPHOCYTES # BLD: 5 % (ref 24–44)
LYMPHOCYTES # BLD: 5 % (ref 24–44)
LYMPHOCYTES # BLD: 6 % (ref 24–44)
LYMPHOCYTES, BODY FLUID: 22 %
Lab: ABNORMAL
Lab: NORMAL
MCH RBC QN AUTO: 27.2 PG (ref 25.2–33.5)
MCH RBC QN AUTO: 27.8 PG (ref 25.2–33.5)
MCH RBC QN AUTO: 27.9 PG (ref 25.2–33.5)
MCH RBC QN AUTO: 29.1 PG (ref 25.2–33.5)
MCH RBC QN AUTO: 29.7 PG (ref 25.2–33.5)
MCH RBC QN AUTO: 29.8 PG (ref 25.2–33.5)
MCHC RBC AUTO-ENTMCNC: 30.6 G/DL (ref 28.4–34.8)
MCHC RBC AUTO-ENTMCNC: 31.3 G/DL (ref 28.4–34.8)
MCHC RBC AUTO-ENTMCNC: 31.4 G/DL (ref 28.4–34.8)
MCHC RBC AUTO-ENTMCNC: 31.5 G/DL (ref 28.4–34.8)
MCHC RBC AUTO-ENTMCNC: 31.5 G/DL (ref 28.4–34.8)
MCHC RBC AUTO-ENTMCNC: 32.9 G/DL (ref 28.4–34.8)
MCV RBC AUTO: 84.9 FL (ref 82.6–102.9)
MCV RBC AUTO: 88.7 FL (ref 82.6–102.9)
MCV RBC AUTO: 89 FL (ref 82.6–102.9)
MCV RBC AUTO: 92.3 FL (ref 82.6–102.9)
MCV RBC AUTO: 94.5 FL (ref 82.6–102.9)
MCV RBC AUTO: 94.7 FL (ref 82.6–102.9)
MONOCYTE, FLUID: NORMAL %
MONOCYTES # BLD: 10 % (ref 1–7)
MONOCYTES # BLD: 12 % (ref 3–12)
MONOCYTES # BLD: 8 % (ref 1–7)
MONOCYTES # BLD: 9 % (ref 1–7)
MORPHOLOGY: ABNORMAL
MUCUS: ABNORMAL
MUCUS: NORMAL
NEUTROPHIL, FLUID: 38 %
NITRITE, URINE: ABNORMAL
NITRITE, URINE: NEGATIVE
NITRITE, URINE: POSITIVE
NRBC AUTOMATED: 0 PER 100 WBC
NRBC AUTOMATED: 0 PER 100 WBC
NRBC AUTOMATED: 0.1 PER 100 WBC
NRBC AUTOMATED: 0.2 PER 100 WBC
NRBC AUTOMATED: 0.3 PER 100 WBC
NRBC AUTOMATED: 0.9 PER 100 WBC
OTHER CELLS FLUID: NORMAL %
OTHER OBSERVATIONS UA: ABNORMAL
OTHER OBSERVATIONS UA: NORMAL
PARTIAL THROMBOPLASTIN TIME: 34.3 SEC (ref 23–31)
PATHOLOGIST REVIEW: NORMAL
PDW BLD-RTO: 13.7 % (ref 11.8–14.4)
PDW BLD-RTO: 16.6 % (ref 11.8–14.4)
PDW BLD-RTO: 17 % (ref 11.8–14.4)
PDW BLD-RTO: 17.4 % (ref 11.8–14.4)
PDW BLD-RTO: 21 % (ref 11.8–14.4)
PDW BLD-RTO: 22 % (ref 11.8–14.4)
PH UA: 5.5 (ref 5–8)
PH UA: 6.5 (ref 5–8)
PH UA: 7 (ref 5–8)
PLATELET # BLD: 136 K/UL (ref 138–453)
PLATELET # BLD: 148 K/UL (ref 138–453)
PLATELET # BLD: 186 K/UL (ref 138–453)
PLATELET # BLD: 190 K/UL (ref 138–453)
PLATELET # BLD: 66 K/UL (ref 138–453)
PLATELET # BLD: 75 K/UL (ref 138–453)
PLATELET # BLD: 94 K/UL (ref 138–453)
PLATELET # BLD: ABNORMAL K/UL (ref 138–453)
PLATELET ESTIMATE: ABNORMAL
PLATELET, FLUORESCENCE: 73 K/UL (ref 138–453)
PLATELET, IMMATURE FRACTION: 11.7 % (ref 1.1–10.3)
PMV BLD AUTO: 11.8 FL (ref 8.1–13.5)
PMV BLD AUTO: 12.1 FL (ref 8.1–13.5)
PMV BLD AUTO: 12.2 FL (ref 8.1–13.5)
PMV BLD AUTO: 12.2 FL (ref 8.1–13.5)
PMV BLD AUTO: 12.9 FL (ref 8.1–13.5)
PMV BLD AUTO: ABNORMAL FL (ref 8.1–13.5)
POTASSIUM SERPL-SCNC: 3.8 MMOL/L (ref 3.7–5.3)
POTASSIUM SERPL-SCNC: 3.8 MMOL/L (ref 3.7–5.3)
POTASSIUM SERPL-SCNC: 3.9 MMOL/L (ref 3.7–5.3)
POTASSIUM SERPL-SCNC: 4.1 MMOL/L (ref 3.7–5.3)
POTASSIUM SERPL-SCNC: 4.3 MMOL/L (ref 3.7–5.3)
POTASSIUM SERPL-SCNC: 4.3 MMOL/L (ref 3.7–5.3)
POTASSIUM SERPL-SCNC: 4.4 MMOL/L (ref 3.7–5.3)
POTASSIUM SERPL-SCNC: 4.8 MMOL/L (ref 3.7–5.3)
PROCALCITONIN: 3.32 NG/ML
PROSTATE SPECIFIC ANTIGEN: 0.43 UG/L
PROSTATE SPECIFIC ANTIGEN: 0.49 UG/L
PROSTATE SPECIFIC ANTIGEN: 0.49 UG/L
PROTEIN UA: ABNORMAL
PROTHROMBIN TIME: 15.4 SEC (ref 9.7–11.6)
PROTHROMBIN TIME: 15.8 SEC (ref 9.7–11.6)
PROTHROMBIN TIME: 16.1 SEC (ref 9.7–11.6)
PROTHROMBIN TIME: 16.5 SEC (ref 9.7–11.6)
PROTHROMBIN TIME: 16.7 SEC (ref 9.7–11.6)
PROTHROMBIN TIME: 17.3 SEC (ref 9.7–11.6)
PROTHROMBIN TIME: 17.8 SEC (ref 9.7–11.6)
PROTHROMBIN TIME: 18 SEC (ref 9.7–11.6)
PROTHROMBIN TIME: 19.1 SEC (ref 9.7–11.6)
PROTHROMBIN TIME: 19.4 SEC (ref 9.7–11.6)
PROTHROMBIN TIME: 19.4 SEC (ref 9.7–11.6)
PROTHROMBIN TIME: 20.1 SEC (ref 9.7–11.6)
PROTHROMBIN TIME: 21.4 SEC (ref 9.7–11.6)
PROTHROMBIN TIME: 22.3 SEC (ref 9.7–11.6)
PROTHROMBIN TIME: 23.9 SEC (ref 9.7–11.6)
PROTHROMBIN TIME: 47.8 SEC (ref 9.7–11.6)
PROTHROMBIN TIME: 51.6 SEC (ref 9.7–11.6)
RBC # BLD: 2.83 M/UL (ref 4.21–5.77)
RBC # BLD: 2.9 M/UL (ref 4.21–5.77)
RBC # BLD: 3.09 M/UL (ref 4.21–5.77)
RBC # BLD: 3.45 M/UL (ref 4.21–5.77)
RBC # BLD: 3.51 M/UL (ref 4.21–5.77)
RBC # BLD: 4.4 M/UL (ref 4.21–5.77)
RBC # BLD: ABNORMAL 10*6/UL
RBC FLUID: 4000 /MM3
RBC UA: ABNORMAL /HPF (ref 0–2)
RBC UA: NORMAL /HPF (ref 0–2)
RENAL EPITHELIAL, UA: ABNORMAL /HPF
RENAL EPITHELIAL, UA: NORMAL /HPF
RETIC %: 9.5 % (ref 0.5–1.9)
RETIC HEMOGLOBIN: 32.5 PG (ref 28.2–35.7)
SEG NEUTROPHILS: 63 % (ref 36–65)
SEG NEUTROPHILS: 81 % (ref 36–66)
SEG NEUTROPHILS: 82 % (ref 36–66)
SEG NEUTROPHILS: 83 % (ref 36–66)
SEG NEUTROPHILS: 83 % (ref 36–66)
SEG NEUTROPHILS: 85 % (ref 36–66)
SEGMENTED NEUTROPHILS ABSOLUTE COUNT: 15.98 K/UL (ref 1.8–7.7)
SEGMENTED NEUTROPHILS ABSOLUTE COUNT: 17.9 K/UL (ref 1.8–7.7)
SEGMENTED NEUTROPHILS ABSOLUTE COUNT: 20.26 K/UL (ref 1.8–7.7)
SEGMENTED NEUTROPHILS ABSOLUTE COUNT: 22.49 K/UL (ref 1.8–7.7)
SEGMENTED NEUTROPHILS ABSOLUTE COUNT: 24.74 K/UL (ref 1.8–7.7)
SEGMENTED NEUTROPHILS ABSOLUTE COUNT: 4.19 K/UL (ref 1.5–8.1)
SODIUM BLD-SCNC: 132 MMOL/L (ref 135–144)
SODIUM BLD-SCNC: 132 MMOL/L (ref 135–144)
SODIUM BLD-SCNC: 133 MMOL/L (ref 135–144)
SODIUM BLD-SCNC: 134 MMOL/L (ref 135–144)
SODIUM BLD-SCNC: 137 MMOL/L (ref 135–144)
SODIUM BLD-SCNC: 139 MMOL/L (ref 135–144)
SODIUM BLD-SCNC: 139 MMOL/L (ref 135–144)
SODIUM BLD-SCNC: 143 MMOL/L (ref 135–144)
SPECIFIC GRAVITY UA: 1 (ref 1–1.03)
SPECIFIC GRAVITY UA: 1.01 (ref 1–1.03)
SPECIFIC GRAVITY UA: 1.02 (ref 1–1.03)
SPECIMEN DESCRIPTION: ABNORMAL
SPECIMEN DESCRIPTION: NORMAL
SPECIMEN TYPE: NORMAL
SURGICAL PATHOLOGY REPORT: NORMAL
TOTAL IRON BINDING CAPACITY: 200 UG/DL (ref 250–450)
TOTAL PROTEIN: 5.8 G/DL (ref 6.4–8.3)
TOTAL PROTEIN: 5.9 G/DL (ref 6.4–8.3)
TOTAL PROTEIN: 6.1 G/DL (ref 6.4–8.3)
TOTAL PROTEIN: 7.1 G/DL (ref 6.4–8.3)
TRANSFUSION STATUS: NORMAL
TRICHOMONAS: ABNORMAL
TRICHOMONAS: NORMAL
TURBIDITY: ABNORMAL
TURBIDITY: CLEAR
UNIT DIVISION: 0
UNIT NUMBER: NORMAL
UNSATURATED IRON BINDING CAPACITY: 148 UG/DL (ref 112–347)
URINE HGB: ABNORMAL
UROBILINOGEN, URINE: ABNORMAL
UROBILINOGEN, URINE: NORMAL
UROBILINOGEN, URINE: NORMAL
WBC # BLD: 18.8 K/UL (ref 3.5–11.3)
WBC # BLD: 22.1 K/UL (ref 3.5–11.3)
WBC # BLD: 24.7 K/UL (ref 3.5–11.3)
WBC # BLD: 27.1 K/UL (ref 3.5–11.3)
WBC # BLD: 29.8 K/UL (ref 3.5–11.3)
WBC # BLD: 6.7 K/UL (ref 3.5–11.3)
WBC # BLD: ABNORMAL 10*3/UL
WBC FLUID: 784 /MM3
WBC UA: ABNORMAL /HPF (ref 0–5)
WBC UA: NORMAL /HPF (ref 0–5)
YEAST: ABNORMAL
YEAST: NORMAL

## 2019-01-01 PROCEDURE — 6370000000 HC RX 637 (ALT 250 FOR IP): Performed by: UROLOGY

## 2019-01-01 PROCEDURE — 6360000002 HC RX W HCPCS: Performed by: INTERNAL MEDICINE

## 2019-01-01 PROCEDURE — 1200000000 HC SEMI PRIVATE

## 2019-01-01 PROCEDURE — 2580000003 HC RX 258: Performed by: NURSE ANESTHETIST, CERTIFIED REGISTERED

## 2019-01-01 PROCEDURE — 6370000000 HC RX 637 (ALT 250 FOR IP): Performed by: INTERNAL MEDICINE

## 2019-01-01 PROCEDURE — 2709999900 HC NON-CHARGEABLE SUPPLY: Performed by: UROLOGY

## 2019-01-01 PROCEDURE — 2500000003 HC RX 250 WO HCPCS: Performed by: UROLOGY

## 2019-01-01 PROCEDURE — 6360000002 HC RX W HCPCS: Performed by: UROLOGY

## 2019-01-01 PROCEDURE — 97116 GAIT TRAINING THERAPY: CPT

## 2019-01-01 PROCEDURE — 99232 SBSQ HOSP IP/OBS MODERATE 35: CPT | Performed by: INTERNAL MEDICINE

## 2019-01-01 PROCEDURE — P9047 ALBUMIN (HUMAN), 25%, 50ML: HCPCS | Performed by: UROLOGY

## 2019-01-01 PROCEDURE — 3600000002 HC SURGERY LEVEL 2 BASE: Performed by: UROLOGY

## 2019-01-01 PROCEDURE — 80048 BASIC METABOLIC PNL TOTAL CA: CPT

## 2019-01-01 PROCEDURE — 85018 HEMOGLOBIN: CPT

## 2019-01-01 PROCEDURE — P9016 RBC LEUKOCYTES REDUCED: HCPCS

## 2019-01-01 PROCEDURE — 81001 URINALYSIS AUTO W/SCOPE: CPT

## 2019-01-01 PROCEDURE — 85049 AUTOMATED PLATELET COUNT: CPT

## 2019-01-01 PROCEDURE — 2580000003 HC RX 258: Performed by: UROLOGY

## 2019-01-01 PROCEDURE — 82728 ASSAY OF FERRITIN: CPT

## 2019-01-01 PROCEDURE — 85379 FIBRIN DEGRADATION QUANT: CPT

## 2019-01-01 PROCEDURE — 85025 COMPLETE CBC W/AUTO DIFF WBC: CPT

## 2019-01-01 PROCEDURE — P9047 ALBUMIN (HUMAN), 25%, 50ML: HCPCS | Performed by: INTERNAL MEDICINE

## 2019-01-01 PROCEDURE — 51702 INSERT TEMP BLADDER CATH: CPT

## 2019-01-01 PROCEDURE — 85610 PROTHROMBIN TIME: CPT

## 2019-01-01 PROCEDURE — 97535 SELF CARE MNGMENT TRAINING: CPT | Performed by: NURSE PRACTITIONER

## 2019-01-01 PROCEDURE — 2500000003 HC RX 250 WO HCPCS: Performed by: INTERNAL MEDICINE

## 2019-01-01 PROCEDURE — 94770 HC ETCO2 MONITOR DAILY: CPT

## 2019-01-01 PROCEDURE — 88305 TISSUE EXAM BY PATHOLOGIST: CPT

## 2019-01-01 PROCEDURE — 36415 COLL VENOUS BLD VENIPUNCTURE: CPT

## 2019-01-01 PROCEDURE — 88304 TISSUE EXAM BY PATHOLOGIST: CPT

## 2019-01-01 PROCEDURE — 97530 THERAPEUTIC ACTIVITIES: CPT

## 2019-01-01 PROCEDURE — 97535 SELF CARE MNGMENT TRAINING: CPT

## 2019-01-01 PROCEDURE — 74176 CT ABD & PELVIS W/O CONTRAST: CPT

## 2019-01-01 PROCEDURE — 82140 ASSAY OF AMMONIA: CPT

## 2019-01-01 PROCEDURE — 6370000000 HC RX 637 (ALT 250 FOR IP): Performed by: NURSE PRACTITIONER

## 2019-01-01 PROCEDURE — 85055 RETICULATED PLATELET ASSAY: CPT

## 2019-01-01 PROCEDURE — 6360000002 HC RX W HCPCS: Performed by: NURSE ANESTHETIST, CERTIFIED REGISTERED

## 2019-01-01 PROCEDURE — 36558 INSERT TUNNELED CV CATH: CPT

## 2019-01-01 PROCEDURE — 85014 HEMATOCRIT: CPT

## 2019-01-01 PROCEDURE — 97110 THERAPEUTIC EXERCISES: CPT

## 2019-01-01 PROCEDURE — 71045 X-RAY EXAM CHEST 1 VIEW: CPT

## 2019-01-01 PROCEDURE — 2580000003 HC RX 258: Performed by: NURSE PRACTITIONER

## 2019-01-01 PROCEDURE — 0W3R8ZZ CONTROL BLEEDING IN GENITOURINARY TRACT, VIA NATURAL OR ARTIFICIAL OPENING ENDOSCOPIC: ICD-10-PCS | Performed by: UROLOGY

## 2019-01-01 PROCEDURE — 36410 VNPNXR 3YR/> PHY/QHP DX/THER: CPT

## 2019-01-01 PROCEDURE — 86901 BLOOD TYPING SEROLOGIC RH(D): CPT

## 2019-01-01 PROCEDURE — 97163 PT EVAL HIGH COMPLEX 45 MIN: CPT

## 2019-01-01 PROCEDURE — 86900 BLOOD TYPING SEROLOGIC ABO: CPT

## 2019-01-01 PROCEDURE — 87086 URINE CULTURE/COLONY COUNT: CPT

## 2019-01-01 PROCEDURE — 99214 OFFICE O/P EST MOD 30 MIN: CPT | Performed by: INTERNAL MEDICINE

## 2019-01-01 PROCEDURE — C1729 CATH, DRAINAGE: HCPCS

## 2019-01-01 PROCEDURE — 99285 EMERGENCY DEPT VISIT HI MDM: CPT

## 2019-01-01 PROCEDURE — 80076 HEPATIC FUNCTION PANEL: CPT

## 2019-01-01 PROCEDURE — 3700000001 HC ADD 15 MINUTES (ANESTHESIA): Performed by: UROLOGY

## 2019-01-01 PROCEDURE — 83010 ASSAY OF HAPTOGLOBIN QUANT: CPT

## 2019-01-01 PROCEDURE — 97530 THERAPEUTIC ACTIVITIES: CPT | Performed by: NURSE PRACTITIONER

## 2019-01-01 PROCEDURE — 73552 X-RAY EXAM OF FEMUR 2/>: CPT

## 2019-01-01 PROCEDURE — 87075 CULTR BACTERIA EXCEPT BLOOD: CPT

## 2019-01-01 PROCEDURE — 84153 ASSAY OF PSA TOTAL: CPT

## 2019-01-01 PROCEDURE — 99223 1ST HOSP IP/OBS HIGH 75: CPT | Performed by: INTERNAL MEDICINE

## 2019-01-01 PROCEDURE — 3700000000 HC ANESTHESIA ATTENDED CARE: Performed by: UROLOGY

## 2019-01-01 PROCEDURE — 2060000000 HC ICU INTERMEDIATE R&B

## 2019-01-01 PROCEDURE — 51700 IRRIGATION OF BLADDER: CPT

## 2019-01-01 PROCEDURE — 87070 CULTURE OTHR SPECIMN AEROBIC: CPT

## 2019-01-01 PROCEDURE — 2580000003 HC RX 258: Performed by: INTERNAL MEDICINE

## 2019-01-01 PROCEDURE — 36430 TRANSFUSION BLD/BLD COMPNT: CPT

## 2019-01-01 PROCEDURE — 99212 OFFICE O/P EST SF 10 MIN: CPT | Performed by: RADIOLOGY

## 2019-01-01 PROCEDURE — 49083 ABD PARACENTESIS W/IMAGING: CPT

## 2019-01-01 PROCEDURE — 83605 ASSAY OF LACTIC ACID: CPT

## 2019-01-01 PROCEDURE — 86850 RBC ANTIBODY SCREEN: CPT

## 2019-01-01 PROCEDURE — 88112 CYTOPATH CELL ENHANCE TECH: CPT

## 2019-01-01 PROCEDURE — 77001 FLUOROGUIDE FOR VEIN DEVICE: CPT

## 2019-01-01 PROCEDURE — 83540 ASSAY OF IRON: CPT

## 2019-01-01 PROCEDURE — 76937 US GUIDE VASCULAR ACCESS: CPT

## 2019-01-01 PROCEDURE — 0T7D8ZZ DILATION OF URETHRA, VIA NATURAL OR ARTIFICIAL OPENING ENDOSCOPIC: ICD-10-PCS | Performed by: UROLOGY

## 2019-01-01 PROCEDURE — 85384 FIBRINOGEN ACTIVITY: CPT

## 2019-01-01 PROCEDURE — 73700 CT LOWER EXTREMITY W/O DYE: CPT

## 2019-01-01 PROCEDURE — 2500000003 HC RX 250 WO HCPCS: Performed by: NURSE ANESTHETIST, CERTIFIED REGISTERED

## 2019-01-01 PROCEDURE — 94761 N-INVAS EAR/PLS OXIMETRY MLT: CPT

## 2019-01-01 PROCEDURE — 80053 COMPREHEN METABOLIC PANEL: CPT

## 2019-01-01 PROCEDURE — 87040 BLOOD CULTURE FOR BACTERIA: CPT

## 2019-01-01 PROCEDURE — 84145 PROCALCITONIN (PCT): CPT

## 2019-01-01 PROCEDURE — 76705 ECHO EXAM OF ABDOMEN: CPT

## 2019-01-01 PROCEDURE — 99284 EMERGENCY DEPT VISIT MOD MDM: CPT

## 2019-01-01 PROCEDURE — 83550 IRON BINDING TEST: CPT

## 2019-01-01 PROCEDURE — 82040 ASSAY OF SERUM ALBUMIN: CPT

## 2019-01-01 PROCEDURE — 86920 COMPATIBILITY TEST SPIN: CPT

## 2019-01-01 PROCEDURE — 2580000003 HC RX 258: Performed by: EMERGENCY MEDICINE

## 2019-01-01 PROCEDURE — 85240 CLOT FACTOR VIII AHG 1 STAGE: CPT

## 2019-01-01 PROCEDURE — C1751 CATH, INF, PER/CENT/MIDLINE: HCPCS

## 2019-01-01 PROCEDURE — 97162 PT EVAL MOD COMPLEX 30 MIN: CPT

## 2019-01-01 PROCEDURE — 85730 THROMBOPLASTIN TIME PARTIAL: CPT

## 2019-01-01 PROCEDURE — 96374 THER/PROPH/DIAG INJ IV PUSH: CPT

## 2019-01-01 PROCEDURE — 86301 IMMUNOASSAY TUMOR CA 19-9: CPT

## 2019-01-01 PROCEDURE — 99153 MOD SED SAME PHYS/QHP EA: CPT

## 2019-01-01 PROCEDURE — 7100000001 HC PACU RECOVERY - ADDTL 15 MIN: Performed by: UROLOGY

## 2019-01-01 PROCEDURE — 7100000000 HC PACU RECOVERY - FIRST 15 MIN: Performed by: UROLOGY

## 2019-01-01 PROCEDURE — 85045 AUTOMATED RETICULOCYTE COUNT: CPT

## 2019-01-01 PROCEDURE — 6360000002 HC RX W HCPCS: Performed by: NURSE PRACTITIONER

## 2019-01-01 PROCEDURE — 97166 OT EVAL MOD COMPLEX 45 MIN: CPT

## 2019-01-01 PROCEDURE — 99152 MOD SED SAME PHYS/QHP 5/>YRS: CPT

## 2019-01-01 PROCEDURE — 88341 IMHCHEM/IMCYTCHM EA ADD ANTB: CPT

## 2019-01-01 PROCEDURE — 3600000012 HC SURGERY LEVEL 2 ADDTL 15MIN: Performed by: UROLOGY

## 2019-01-01 PROCEDURE — 6360000002 HC RX W HCPCS: Performed by: EMERGENCY MEDICINE

## 2019-01-01 PROCEDURE — 87205 SMEAR GRAM STAIN: CPT

## 2019-01-01 PROCEDURE — 88342 IMHCHEM/IMCYTCHM 1ST ANTB: CPT

## 2019-01-01 PROCEDURE — 88313 SPECIAL STAINS GROUP 2: CPT

## 2019-01-01 PROCEDURE — 89051 BODY FLUID CELL COUNT: CPT

## 2019-01-01 RX ORDER — OXYCODONE HYDROCHLORIDE AND ACETAMINOPHEN 5; 325 MG/1; MG/1
2 TABLET ORAL PRN
Status: DISCONTINUED | OUTPATIENT
Start: 2019-01-01 | End: 2019-01-01 | Stop reason: HOSPADM

## 2019-01-01 RX ORDER — AMOXICILLIN 500 MG/1
500 CAPSULE ORAL 2 TIMES DAILY
COMMUNITY

## 2019-01-01 RX ORDER — HYDROMORPHONE HYDROCHLORIDE 2 MG/1
1 TABLET ORAL
Status: DISCONTINUED | OUTPATIENT
Start: 2019-01-01 | End: 2019-01-01

## 2019-01-01 RX ORDER — PANTOPRAZOLE SODIUM 40 MG/1
40 TABLET, DELAYED RELEASE ORAL
COMMUNITY

## 2019-01-01 RX ORDER — MIDODRINE HYDROCHLORIDE 10 MG/1
10 TABLET ORAL
COMMUNITY

## 2019-01-01 RX ORDER — 0.9 % SODIUM CHLORIDE 0.9 %
250 INTRAVENOUS SOLUTION INTRAVENOUS ONCE
Status: COMPLETED | OUTPATIENT
Start: 2019-01-01 | End: 2019-01-01

## 2019-01-01 RX ORDER — OXYCODONE HYDROCHLORIDE AND ACETAMINOPHEN 5; 325 MG/1; MG/1
1 TABLET ORAL PRN
Status: DISCONTINUED | OUTPATIENT
Start: 2019-01-01 | End: 2019-01-01 | Stop reason: HOSPADM

## 2019-01-01 RX ORDER — HYDROMORPHONE HCL 110MG/55ML
0.5 PATIENT CONTROLLED ANALGESIA SYRINGE INTRAVENOUS EVERY 5 MIN PRN
Status: DISCONTINUED | OUTPATIENT
Start: 2019-01-01 | End: 2019-01-01 | Stop reason: HOSPADM

## 2019-01-01 RX ORDER — WARFARIN SODIUM 2 MG/1
2 TABLET ORAL DAILY
Status: DISCONTINUED | OUTPATIENT
Start: 2019-01-01 | End: 2019-01-01

## 2019-01-01 RX ORDER — ONDANSETRON 2 MG/ML
4 INJECTION INTRAMUSCULAR; INTRAVENOUS
Status: DISCONTINUED | OUTPATIENT
Start: 2019-01-01 | End: 2019-01-01 | Stop reason: HOSPADM

## 2019-01-01 RX ORDER — PROCHLORPERAZINE MALEATE 10 MG
10 TABLET ORAL EVERY 6 HOURS PRN
Status: ON HOLD | COMMUNITY
End: 2019-01-01 | Stop reason: HOSPADM

## 2019-01-01 RX ORDER — PANTOPRAZOLE SODIUM 40 MG/1
40 TABLET, DELAYED RELEASE ORAL
Status: DISCONTINUED | OUTPATIENT
Start: 2019-01-01 | End: 2019-01-01 | Stop reason: HOSPADM

## 2019-01-01 RX ORDER — SENNA AND DOCUSATE SODIUM 50; 8.6 MG/1; MG/1
1 TABLET, FILM COATED ORAL DAILY PRN
Status: DISCONTINUED | OUTPATIENT
Start: 2019-01-01 | End: 2019-01-01 | Stop reason: HOSPADM

## 2019-01-01 RX ORDER — SODIUM CHLORIDE 0.9 % (FLUSH) 0.9 %
10 SYRINGE (ML) INJECTION PRN
Status: DISCONTINUED | OUTPATIENT
Start: 2019-01-01 | End: 2019-01-01 | Stop reason: HOSPADM

## 2019-01-01 RX ORDER — LACTULOSE 10 G/15ML
20 SOLUTION ORAL 3 TIMES DAILY
Qty: 1 BOTTLE | Refills: 2 | Status: ON HOLD | OUTPATIENT
Start: 2019-01-01 | End: 2019-01-01 | Stop reason: SDUPTHER

## 2019-01-01 RX ORDER — OXYCODONE HYDROCHLORIDE 5 MG/1
5 TABLET ORAL EVERY 6 HOURS PRN
Status: DISCONTINUED | OUTPATIENT
Start: 2019-01-01 | End: 2019-01-01 | Stop reason: HOSPADM

## 2019-01-01 RX ORDER — LACTULOSE 10 G/15ML
20 SOLUTION ORAL 3 TIMES DAILY
Status: DISCONTINUED | OUTPATIENT
Start: 2019-01-01 | End: 2019-01-01 | Stop reason: HOSPADM

## 2019-01-01 RX ORDER — SODIUM CHLORIDE 9 MG/ML
INJECTION, SOLUTION INTRAVENOUS CONTINUOUS
Status: DISCONTINUED | OUTPATIENT
Start: 2019-01-01 | End: 2019-01-01

## 2019-01-01 RX ORDER — PROPOFOL 10 MG/ML
INJECTION, EMULSION INTRAVENOUS CONTINUOUS PRN
Status: DISCONTINUED | OUTPATIENT
Start: 2019-01-01 | End: 2019-01-01 | Stop reason: SDUPTHER

## 2019-01-01 RX ORDER — M-VIT,TX,IRON,MINS/CALC/FOLIC 27MG-0.4MG
1 TABLET ORAL DAILY
Status: DISCONTINUED | OUTPATIENT
Start: 2019-01-01 | End: 2019-01-01 | Stop reason: HOSPADM

## 2019-01-01 RX ORDER — SODIUM CHLORIDE 0.9 % (FLUSH) 0.9 %
10 SYRINGE (ML) INJECTION EVERY 12 HOURS SCHEDULED
Status: DISCONTINUED | OUTPATIENT
Start: 2019-01-01 | End: 2019-01-01 | Stop reason: HOSPADM

## 2019-01-01 RX ORDER — PROMETHAZINE HYDROCHLORIDE 25 MG/ML
6.25 INJECTION, SOLUTION INTRAMUSCULAR; INTRAVENOUS
Status: DISCONTINUED | OUTPATIENT
Start: 2019-01-01 | End: 2019-01-01 | Stop reason: HOSPADM

## 2019-01-01 RX ORDER — HYDRALAZINE HYDROCHLORIDE 20 MG/ML
5 INJECTION INTRAMUSCULAR; INTRAVENOUS EVERY 10 MIN PRN
Status: DISCONTINUED | OUTPATIENT
Start: 2019-01-01 | End: 2019-01-01 | Stop reason: HOSPADM

## 2019-01-01 RX ORDER — MIDODRINE HYDROCHLORIDE 10 MG/1
10 TABLET ORAL
Qty: 90 TABLET | Refills: 1 | Status: ON HOLD | OUTPATIENT
Start: 2019-01-01 | End: 2019-01-01 | Stop reason: SDUPTHER

## 2019-01-01 RX ORDER — LACTULOSE 10 G/15ML
30 SOLUTION ORAL 3 TIMES DAILY
COMMUNITY

## 2019-01-01 RX ORDER — PANTOPRAZOLE SODIUM 40 MG/1
40 TABLET, DELAYED RELEASE ORAL
Qty: 30 TABLET | Refills: 1 | Status: ON HOLD | OUTPATIENT
Start: 2019-01-01 | End: 2019-01-01 | Stop reason: SDUPTHER

## 2019-01-01 RX ORDER — LABETALOL HYDROCHLORIDE 5 MG/ML
5 INJECTION, SOLUTION INTRAVENOUS EVERY 10 MIN PRN
Status: DISCONTINUED | OUTPATIENT
Start: 2019-01-01 | End: 2019-01-01 | Stop reason: HOSPADM

## 2019-01-01 RX ORDER — BUMETANIDE 0.25 MG/ML
1 INJECTION, SOLUTION INTRAMUSCULAR; INTRAVENOUS 2 TIMES DAILY
Status: DISCONTINUED | OUTPATIENT
Start: 2019-01-01 | End: 2019-01-01

## 2019-01-01 RX ORDER — MIDODRINE HYDROCHLORIDE 10 MG/1
10 TABLET ORAL
Status: DISCONTINUED | OUTPATIENT
Start: 2019-01-01 | End: 2019-01-01 | Stop reason: HOSPADM

## 2019-01-01 RX ORDER — FUROSEMIDE 10 MG/ML
20 INJECTION INTRAMUSCULAR; INTRAVENOUS SEE ADMIN INSTRUCTIONS
Status: COMPLETED | OUTPATIENT
Start: 2019-01-01 | End: 2019-01-01

## 2019-01-01 RX ORDER — HYDROMORPHONE HYDROCHLORIDE 2 MG/1
1 TABLET ORAL EVERY 8 HOURS PRN
Status: DISCONTINUED | OUTPATIENT
Start: 2019-01-01 | End: 2019-01-01

## 2019-01-01 RX ORDER — PHENYLEPHRINE HCL IN 0.9% NACL 1 MG/10 ML
SYRINGE (ML) INTRAVENOUS PRN
Status: DISCONTINUED | OUTPATIENT
Start: 2019-01-01 | End: 2019-01-01 | Stop reason: SDUPTHER

## 2019-01-01 RX ORDER — WARFARIN SODIUM 5 MG/1
5 TABLET ORAL DAILY
Status: DISCONTINUED | OUTPATIENT
Start: 2019-01-01 | End: 2019-01-01

## 2019-01-01 RX ORDER — OXYCODONE HYDROCHLORIDE 5 MG/1
5 TABLET ORAL EVERY 4 HOURS PRN
Status: ON HOLD | COMMUNITY
End: 2019-01-01 | Stop reason: HOSPADM

## 2019-01-01 RX ORDER — 0.9 % SODIUM CHLORIDE 0.9 %
250 INTRAVENOUS SOLUTION INTRAVENOUS ONCE
Status: DISCONTINUED | OUTPATIENT
Start: 2019-01-01 | End: 2019-01-01 | Stop reason: HOSPADM

## 2019-01-01 RX ORDER — AMOXICILLIN 500 MG/1
500 CAPSULE ORAL 2 TIMES DAILY
Status: DISCONTINUED | OUTPATIENT
Start: 2019-01-01 | End: 2019-01-01 | Stop reason: HOSPADM

## 2019-01-01 RX ORDER — LUBIPROSTONE 8 UG/1
8 CAPSULE, GELATIN COATED ORAL 2 TIMES DAILY WITH MEALS
Status: DISCONTINUED | OUTPATIENT
Start: 2019-01-01 | End: 2019-01-01 | Stop reason: HOSPADM

## 2019-01-01 RX ORDER — AMOXICILLIN 500 MG/1
500 CAPSULE ORAL 2 TIMES DAILY
Qty: 60 CAPSULE | Refills: 5 | Status: ON HOLD | OUTPATIENT
Start: 2019-01-01 | End: 2019-01-01 | Stop reason: SDUPTHER

## 2019-01-01 RX ORDER — FENTANYL CITRATE 50 UG/ML
INJECTION, SOLUTION INTRAMUSCULAR; INTRAVENOUS PRN
Status: DISCONTINUED | OUTPATIENT
Start: 2019-01-01 | End: 2019-01-01 | Stop reason: SDUPTHER

## 2019-01-01 RX ORDER — AMOXICILLIN 250 MG
1 CAPSULE ORAL DAILY PRN
COMMUNITY

## 2019-01-01 RX ORDER — FUROSEMIDE 10 MG/ML
20 INJECTION INTRAMUSCULAR; INTRAVENOUS ONCE
Status: COMPLETED | OUTPATIENT
Start: 2019-01-01 | End: 2019-01-01

## 2019-01-01 RX ORDER — NALOXONE HYDROCHLORIDE 0.4 MG/ML
0.4 INJECTION, SOLUTION INTRAMUSCULAR; INTRAVENOUS; SUBCUTANEOUS PRN
Status: DISCONTINUED | OUTPATIENT
Start: 2019-01-01 | End: 2019-01-01 | Stop reason: HOSPADM

## 2019-01-01 RX ORDER — OXYCODONE HYDROCHLORIDE 5 MG/1
5 TABLET ORAL EVERY 4 HOURS PRN
Status: DISCONTINUED | OUTPATIENT
Start: 2019-01-01 | End: 2019-01-01

## 2019-01-01 RX ORDER — AMOXICILLIN 250 MG
1 CAPSULE ORAL PRN
Status: DISCONTINUED | OUTPATIENT
Start: 2019-01-01 | End: 2019-01-01

## 2019-01-01 RX ORDER — PROPOFOL 10 MG/ML
INJECTION, EMULSION INTRAVENOUS PRN
Status: DISCONTINUED | OUTPATIENT
Start: 2019-01-01 | End: 2019-01-01 | Stop reason: SDUPTHER

## 2019-01-01 RX ORDER — HYDROMORPHONE HYDROCHLORIDE 2 MG/1
1 TABLET ORAL EVERY 4 HOURS PRN
Status: DISCONTINUED | OUTPATIENT
Start: 2019-01-01 | End: 2019-01-01

## 2019-01-01 RX ORDER — FENTANYL CITRATE 50 UG/ML
50 INJECTION, SOLUTION INTRAMUSCULAR; INTRAVENOUS EVERY 5 MIN PRN
Status: DISCONTINUED | OUTPATIENT
Start: 2019-01-01 | End: 2019-01-01 | Stop reason: HOSPADM

## 2019-01-01 RX ORDER — FENTANYL CITRATE 50 UG/ML
25 INJECTION, SOLUTION INTRAMUSCULAR; INTRAVENOUS EVERY 5 MIN PRN
Status: DISCONTINUED | OUTPATIENT
Start: 2019-01-01 | End: 2019-01-01 | Stop reason: HOSPADM

## 2019-01-01 RX ORDER — HYDROMORPHONE HYDROCHLORIDE 1 MG/ML
1 INJECTION, SOLUTION INTRAMUSCULAR; INTRAVENOUS; SUBCUTANEOUS ONCE
Status: COMPLETED | OUTPATIENT
Start: 2019-01-01 | End: 2019-01-01

## 2019-01-01 RX ORDER — HYDROMORPHONE HYDROCHLORIDE 2 MG/1
1 TABLET ORAL 2 TIMES DAILY PRN
Status: DISCONTINUED | OUTPATIENT
Start: 2019-01-01 | End: 2019-01-01 | Stop reason: HOSPADM

## 2019-01-01 RX ORDER — BUMETANIDE 0.25 MG/ML
1 INJECTION, SOLUTION INTRAMUSCULAR; INTRAVENOUS DAILY
Status: DISCONTINUED | OUTPATIENT
Start: 2019-01-01 | End: 2019-01-01 | Stop reason: HOSPADM

## 2019-01-01 RX ORDER — SODIUM CHLORIDE 9 MG/ML
INJECTION, SOLUTION INTRAVENOUS CONTINUOUS PRN
Status: DISCONTINUED | OUTPATIENT
Start: 2019-01-01 | End: 2019-01-01 | Stop reason: SDUPTHER

## 2019-01-01 RX ORDER — HYDROMORPHONE HYDROCHLORIDE 2 MG/1
1 TABLET ORAL
Status: ON HOLD | COMMUNITY
End: 2019-01-01 | Stop reason: HOSPADM

## 2019-01-01 RX ORDER — PROCHLORPERAZINE MALEATE 10 MG
10 TABLET ORAL EVERY 6 HOURS PRN
Status: DISCONTINUED | OUTPATIENT
Start: 2019-01-01 | End: 2019-01-01 | Stop reason: HOSPADM

## 2019-01-01 RX ORDER — PANTOPRAZOLE SODIUM 40 MG/1
40 TABLET, DELAYED RELEASE ORAL
Status: DISCONTINUED | OUTPATIENT
Start: 2019-01-01 | End: 2019-01-01

## 2019-01-01 RX ORDER — BUMETANIDE 0.25 MG/ML
1 INJECTION, SOLUTION INTRAMUSCULAR; INTRAVENOUS EVERY 8 HOURS
Status: DISCONTINUED | OUTPATIENT
Start: 2019-01-01 | End: 2019-01-01

## 2019-01-01 RX ORDER — WARFARIN SODIUM 3 MG/1
1.5-3 TABLET ORAL DAILY
COMMUNITY
End: 2019-01-01 | Stop reason: CLARIF

## 2019-01-01 RX ORDER — WARFARIN SODIUM 2.5 MG/1
2.5 TABLET ORAL DAILY
Status: DISCONTINUED | OUTPATIENT
Start: 2019-01-01 | End: 2019-01-01

## 2019-01-01 RX ORDER — ALBUMIN (HUMAN) 12.5 G/50ML
25 SOLUTION INTRAVENOUS DAILY
Status: DISCONTINUED | OUTPATIENT
Start: 2019-01-01 | End: 2019-01-01 | Stop reason: HOSPADM

## 2019-01-01 RX ORDER — WARFARIN SODIUM 3 MG/1
3 TABLET ORAL DAILY
Status: DISCONTINUED | OUTPATIENT
Start: 2019-01-01 | End: 2019-01-01 | Stop reason: HOSPADM

## 2019-01-01 RX ORDER — MIDODRINE HYDROCHLORIDE 5 MG/1
5 TABLET ORAL
Status: DISCONTINUED | OUTPATIENT
Start: 2019-01-01 | End: 2019-01-01

## 2019-01-01 RX ORDER — MIDAZOLAM HYDROCHLORIDE 1 MG/ML
INJECTION INTRAMUSCULAR; INTRAVENOUS PRN
Status: DISCONTINUED | OUTPATIENT
Start: 2019-01-01 | End: 2019-01-01 | Stop reason: SDUPTHER

## 2019-01-01 RX ORDER — WARFARIN SODIUM 2.5 MG/1
2.5 TABLET ORAL
Status: COMPLETED | OUTPATIENT
Start: 2019-01-01 | End: 2019-01-01

## 2019-01-01 RX ADMIN — MIDODRINE HYDROCHLORIDE 10 MG: 10 TABLET ORAL at 09:11

## 2019-01-01 RX ADMIN — Medication 25 MCG: at 15:02

## 2019-01-01 RX ADMIN — PROPOFOL 40 MG: 10 INJECTION, EMULSION INTRAVENOUS at 15:20

## 2019-01-01 RX ADMIN — PROPOFOL 30 MG: 10 INJECTION, EMULSION INTRAVENOUS at 15:13

## 2019-01-01 RX ADMIN — PROCHLORPERAZINE MALEATE 10 MG: 10 TABLET, FILM COATED ORAL at 08:58

## 2019-01-01 RX ADMIN — Medication 10 ML: at 10:16

## 2019-01-01 RX ADMIN — SENNOSIDES AND DOCUSATE SODIUM 1 TABLET: 8.6; 5 TABLET ORAL at 09:54

## 2019-01-01 RX ADMIN — PROCHLORPERAZINE MALEATE 10 MG: 10 TABLET, FILM COATED ORAL at 07:07

## 2019-01-01 RX ADMIN — Medication 10 ML: at 08:15

## 2019-01-01 RX ADMIN — PROPOFOL 20 MG: 10 INJECTION, EMULSION INTRAVENOUS at 15:02

## 2019-01-01 RX ADMIN — OXYCODONE HYDROCHLORIDE 5 MG: 5 TABLET ORAL at 10:36

## 2019-01-01 RX ADMIN — CEFTRIAXONE SODIUM 1 G: 1 INJECTION, POWDER, FOR SOLUTION INTRAMUSCULAR; INTRAVENOUS at 20:03

## 2019-01-01 RX ADMIN — IRON SUCROSE 200 MG: 20 INJECTION, SOLUTION INTRAVENOUS at 14:13

## 2019-01-01 RX ADMIN — Medication 10 ML: at 21:45

## 2019-01-01 RX ADMIN — BUMETANIDE 1 MG: 0.25 INJECTION INTRAMUSCULAR; INTRAVENOUS at 08:55

## 2019-01-01 RX ADMIN — WARFARIN SODIUM 2 MG: 2 TABLET ORAL at 18:07

## 2019-01-01 RX ADMIN — MIDODRINE HYDROCHLORIDE 10 MG: 10 TABLET ORAL at 17:34

## 2019-01-01 RX ADMIN — Medication 12 MG: at 03:54

## 2019-01-01 RX ADMIN — HYDROMORPHONE HYDROCHLORIDE 1 MG: 2 TABLET ORAL at 02:57

## 2019-01-01 RX ADMIN — LACTULOSE 20 G: 20 SOLUTION ORAL at 22:27

## 2019-01-01 RX ADMIN — OXYCODONE HYDROCHLORIDE 5 MG: 5 TABLET ORAL at 13:39

## 2019-01-01 RX ADMIN — CEFTRIAXONE SODIUM 1 G: 1 INJECTION, POWDER, FOR SOLUTION INTRAMUSCULAR; INTRAVENOUS at 17:12

## 2019-01-01 RX ADMIN — WARFARIN SODIUM 2.5 MG: 2.5 TABLET ORAL at 17:55

## 2019-01-01 RX ADMIN — HYDROMORPHONE HYDROCHLORIDE 1 MG: 2 TABLET ORAL at 13:08

## 2019-01-01 RX ADMIN — OXYCODONE HYDROCHLORIDE 5 MG: 5 TABLET ORAL at 19:30

## 2019-01-01 RX ADMIN — BUMETANIDE 1 MG: 0.25 INJECTION INTRAMUSCULAR; INTRAVENOUS at 09:03

## 2019-01-01 RX ADMIN — MIDODRINE HYDROCHLORIDE 10 MG: 10 TABLET ORAL at 06:48

## 2019-01-01 RX ADMIN — WARFARIN SODIUM 3 MG: 3 TABLET ORAL at 17:33

## 2019-01-01 RX ADMIN — PROPOFOL 10 MG: 10 INJECTION, EMULSION INTRAVENOUS at 14:20

## 2019-01-01 RX ADMIN — HYDROMORPHONE HYDROCHLORIDE 1 MG: 2 TABLET ORAL at 23:47

## 2019-01-01 RX ADMIN — LUBIPROSTONE 8 MCG: 8 CAPSULE, GELATIN COATED ORAL at 08:55

## 2019-01-01 RX ADMIN — OXYCODONE HYDROCHLORIDE 5 MG: 5 TABLET ORAL at 18:15

## 2019-01-01 RX ADMIN — Medication 10 ML: at 10:08

## 2019-01-01 RX ADMIN — WARFARIN SODIUM 2 MG: 2 TABLET ORAL at 17:12

## 2019-01-01 RX ADMIN — SENNOSIDES AND DOCUSATE SODIUM 1 TABLET: 8.6; 5 TABLET ORAL at 23:37

## 2019-01-01 RX ADMIN — WARFARIN SODIUM 3 MG: 3 TABLET ORAL at 17:41

## 2019-01-01 RX ADMIN — LUBIPROSTONE 8 MCG: 8 CAPSULE, GELATIN COATED ORAL at 08:22

## 2019-01-01 RX ADMIN — PANTOPRAZOLE SODIUM 40 MG: 40 TABLET, DELAYED RELEASE ORAL at 07:00

## 2019-01-01 RX ADMIN — OXYCODONE HYDROCHLORIDE 5 MG: 5 TABLET ORAL at 00:17

## 2019-01-01 RX ADMIN — MIDODRINE HYDROCHLORIDE 10 MG: 10 TABLET ORAL at 17:29

## 2019-01-01 RX ADMIN — MIDODRINE HYDROCHLORIDE 10 MG: 10 TABLET ORAL at 14:44

## 2019-01-01 RX ADMIN — BUMETANIDE 1 MG: 0.25 INJECTION INTRAMUSCULAR; INTRAVENOUS at 21:49

## 2019-01-01 RX ADMIN — Medication 10 ML: at 02:52

## 2019-01-01 RX ADMIN — LUBIPROSTONE 8 MCG: 8 CAPSULE, GELATIN COATED ORAL at 17:30

## 2019-01-01 RX ADMIN — PROPOFOL 20 MG: 10 INJECTION, EMULSION INTRAVENOUS at 14:59

## 2019-01-01 RX ADMIN — LACTULOSE 20 G: 20 SOLUTION ORAL at 12:40

## 2019-01-01 RX ADMIN — LACTULOSE 20 G: 20 SOLUTION ORAL at 13:16

## 2019-01-01 RX ADMIN — BUMETANIDE 1 MG: 0.25 INJECTION INTRAMUSCULAR; INTRAVENOUS at 18:07

## 2019-01-01 RX ADMIN — PROCHLORPERAZINE MALEATE 10 MG: 10 TABLET, FILM COATED ORAL at 23:56

## 2019-01-01 RX ADMIN — BUMETANIDE 1 MG: 0.25 INJECTION INTRAMUSCULAR; INTRAVENOUS at 21:44

## 2019-01-01 RX ADMIN — PANTOPRAZOLE SODIUM 40 MG: 40 TABLET, DELAYED RELEASE ORAL at 06:48

## 2019-01-01 RX ADMIN — CEFTRIAXONE SODIUM 1 G: 1 INJECTION, POWDER, FOR SOLUTION INTRAMUSCULAR; INTRAVENOUS at 18:01

## 2019-01-01 RX ADMIN — BUMETANIDE 1 MG: 0.25 INJECTION INTRAMUSCULAR; INTRAVENOUS at 17:56

## 2019-01-01 RX ADMIN — BUMETANIDE 1 MG: 0.25 INJECTION INTRAMUSCULAR; INTRAVENOUS at 08:15

## 2019-01-01 RX ADMIN — HYDROMORPHONE HYDROCHLORIDE 1 MG: 2 TABLET ORAL at 02:17

## 2019-01-01 RX ADMIN — BUMETANIDE 1 MG: 0.25 INJECTION INTRAMUSCULAR; INTRAVENOUS at 18:29

## 2019-01-01 RX ADMIN — OXYCODONE HYDROCHLORIDE 5 MG: 5 TABLET ORAL at 09:50

## 2019-01-01 RX ADMIN — MULTIPLE VITAMINS W/ MINERALS TAB 1 TABLET: TAB at 08:58

## 2019-01-01 RX ADMIN — HYDROMORPHONE HYDROCHLORIDE 1 MG: 2 TABLET ORAL at 19:38

## 2019-01-01 RX ADMIN — MULTIPLE VITAMINS W/ MINERALS TAB 1 TABLET: TAB at 09:11

## 2019-01-01 RX ADMIN — OXYCODONE HYDROCHLORIDE 5 MG: 5 TABLET ORAL at 05:27

## 2019-01-01 RX ADMIN — OXYCODONE HYDROCHLORIDE 5 MG: 5 TABLET ORAL at 22:26

## 2019-01-01 RX ADMIN — CEFTRIAXONE SODIUM 1 G: 1 INJECTION, POWDER, FOR SOLUTION INTRAMUSCULAR; INTRAVENOUS at 18:46

## 2019-01-01 RX ADMIN — HYDROMORPHONE HYDROCHLORIDE 1 MG: 2 TABLET ORAL at 18:35

## 2019-01-01 RX ADMIN — OXYCODONE HYDROCHLORIDE 5 MG: 5 TABLET ORAL at 18:45

## 2019-01-01 RX ADMIN — LUBIPROSTONE 8 MCG: 8 CAPSULE, GELATIN COATED ORAL at 08:15

## 2019-01-01 RX ADMIN — LUBIPROSTONE 8 MCG: 8 CAPSULE, GELATIN COATED ORAL at 07:59

## 2019-01-01 RX ADMIN — WARFARIN SODIUM 3 MG: 3 TABLET ORAL at 18:37

## 2019-01-01 RX ADMIN — PROCHLORPERAZINE MALEATE 10 MG: 10 TABLET, FILM COATED ORAL at 09:28

## 2019-01-01 RX ADMIN — LUBIPROSTONE 8 MCG: 8 CAPSULE, GELATIN COATED ORAL at 16:57

## 2019-01-01 RX ADMIN — ALBUMIN (HUMAN) 25 G: 0.25 INJECTION, SOLUTION INTRAVENOUS at 08:16

## 2019-01-01 RX ADMIN — OXYCODONE HYDROCHLORIDE 5 MG: 5 TABLET ORAL at 19:57

## 2019-01-01 RX ADMIN — PANTOPRAZOLE SODIUM 40 MG: 40 TABLET, DELAYED RELEASE ORAL at 10:15

## 2019-01-01 RX ADMIN — CEFTRIAXONE SODIUM 1 G: 1 INJECTION, POWDER, FOR SOLUTION INTRAMUSCULAR; INTRAVENOUS at 17:33

## 2019-01-01 RX ADMIN — OXYCODONE HYDROCHLORIDE 5 MG: 5 TABLET ORAL at 10:03

## 2019-01-01 RX ADMIN — BUMETANIDE 1 MG: 0.25 INJECTION INTRAMUSCULAR; INTRAVENOUS at 22:27

## 2019-01-01 RX ADMIN — Medication 10 ML: at 22:27

## 2019-01-01 RX ADMIN — LACTULOSE 20 G: 20 SOLUTION ORAL at 09:11

## 2019-01-01 RX ADMIN — Medication 10 ML: at 20:04

## 2019-01-01 RX ADMIN — PROCHLORPERAZINE MALEATE 10 MG: 10 TABLET, FILM COATED ORAL at 15:27

## 2019-01-01 RX ADMIN — LUBIPROSTONE 8 MCG: 8 CAPSULE, GELATIN COATED ORAL at 10:04

## 2019-01-01 RX ADMIN — PROPOFOL 20 MG: 10 INJECTION, EMULSION INTRAVENOUS at 14:03

## 2019-01-01 RX ADMIN — Medication 10 ML: at 11:39

## 2019-01-01 RX ADMIN — OXYCODONE HYDROCHLORIDE 5 MG: 5 TABLET ORAL at 13:28

## 2019-01-01 RX ADMIN — MIDODRINE HYDROCHLORIDE 5 MG: 5 TABLET ORAL at 12:21

## 2019-01-01 RX ADMIN — ALBUMIN (HUMAN) 25 G: 0.25 INJECTION, SOLUTION INTRAVENOUS at 08:59

## 2019-01-01 RX ADMIN — PROCHLORPERAZINE MALEATE 10 MG: 10 TABLET, FILM COATED ORAL at 00:40

## 2019-01-01 RX ADMIN — Medication 10 ML: at 21:17

## 2019-01-01 RX ADMIN — HYDROMORPHONE HYDROCHLORIDE 0.5 MG: 2 TABLET ORAL at 11:03

## 2019-01-01 RX ADMIN — MIDODRINE HYDROCHLORIDE 10 MG: 10 TABLET ORAL at 10:18

## 2019-01-01 RX ADMIN — ALBUMIN (HUMAN) 25 G: 0.25 INJECTION, SOLUTION INTRAVENOUS at 10:26

## 2019-01-01 RX ADMIN — LACTULOSE 20 G: 20 SOLUTION ORAL at 10:15

## 2019-01-01 RX ADMIN — PROPOFOL 30 MG: 10 INJECTION, EMULSION INTRAVENOUS at 14:55

## 2019-01-01 RX ADMIN — PANTOPRAZOLE SODIUM 40 MG: 40 TABLET, DELAYED RELEASE ORAL at 06:24

## 2019-01-01 RX ADMIN — MIDODRINE HYDROCHLORIDE 10 MG: 10 TABLET ORAL at 13:39

## 2019-01-01 RX ADMIN — HYDROMORPHONE HYDROCHLORIDE 1 MG: 2 TABLET ORAL at 22:11

## 2019-01-01 RX ADMIN — OXYCODONE HYDROCHLORIDE 5 MG: 5 TABLET ORAL at 21:05

## 2019-01-01 RX ADMIN — CEFTRIAXONE SODIUM 1 G: 1 INJECTION, POWDER, FOR SOLUTION INTRAMUSCULAR; INTRAVENOUS at 18:39

## 2019-01-01 RX ADMIN — BUMETANIDE 1 MG: 0.25 INJECTION INTRAMUSCULAR; INTRAVENOUS at 18:41

## 2019-01-01 RX ADMIN — HYDROMORPHONE HYDROCHLORIDE 1 MG: 2 TABLET ORAL at 22:16

## 2019-01-01 RX ADMIN — PROCHLORPERAZINE MALEATE 10 MG: 10 TABLET, FILM COATED ORAL at 09:27

## 2019-01-01 RX ADMIN — LUBIPROSTONE 8 MCG: 8 CAPSULE, GELATIN COATED ORAL at 12:21

## 2019-01-01 RX ADMIN — IRON SUCROSE 200 MG: 20 INJECTION, SOLUTION INTRAVENOUS at 15:19

## 2019-01-01 RX ADMIN — OXYCODONE HYDROCHLORIDE 5 MG: 5 TABLET ORAL at 00:57

## 2019-01-01 RX ADMIN — WARFARIN SODIUM 2.5 MG: 2.5 TABLET ORAL at 17:30

## 2019-01-01 RX ADMIN — MIDODRINE HYDROCHLORIDE 10 MG: 10 TABLET ORAL at 12:39

## 2019-01-01 RX ADMIN — CEFTRIAXONE SODIUM 1 G: 1 INJECTION, POWDER, FOR SOLUTION INTRAMUSCULAR; INTRAVENOUS at 19:20

## 2019-01-01 RX ADMIN — PROPOFOL 30 MG: 10 INJECTION, EMULSION INTRAVENOUS at 15:05

## 2019-01-01 RX ADMIN — LUBIPROSTONE 8 MCG: 8 CAPSULE, GELATIN COATED ORAL at 17:40

## 2019-01-01 RX ADMIN — MIDODRINE HYDROCHLORIDE 10 MG: 10 TABLET ORAL at 16:57

## 2019-01-01 RX ADMIN — PROPOFOL 30 MG: 10 INJECTION, EMULSION INTRAVENOUS at 15:09

## 2019-01-01 RX ADMIN — MULTIPLE VITAMINS W/ MINERALS TAB 1 TABLET: TAB at 10:04

## 2019-01-01 RX ADMIN — MULTIPLE VITAMINS W/ MINERALS TAB 1 TABLET: TAB at 08:26

## 2019-01-01 RX ADMIN — CEFTRIAXONE SODIUM 1 G: 1 INJECTION, POWDER, FOR SOLUTION INTRAMUSCULAR; INTRAVENOUS at 18:36

## 2019-01-01 RX ADMIN — MIDODRINE HYDROCHLORIDE 10 MG: 10 TABLET ORAL at 08:55

## 2019-01-01 RX ADMIN — PROPOFOL 20 MG: 10 INJECTION, EMULSION INTRAVENOUS at 14:57

## 2019-01-01 RX ADMIN — Medication 10 ML: at 21:49

## 2019-01-01 RX ADMIN — OXYCODONE HYDROCHLORIDE 5 MG: 5 TABLET ORAL at 01:59

## 2019-01-01 RX ADMIN — ALBUMIN (HUMAN) 25 G: 0.25 INJECTION, SOLUTION INTRAVENOUS at 13:10

## 2019-01-01 RX ADMIN — OXYCODONE HYDROCHLORIDE 5 MG: 5 TABLET ORAL at 09:54

## 2019-01-01 RX ADMIN — MULTIPLE VITAMINS W/ MINERALS TAB 1 TABLET: TAB at 08:22

## 2019-01-01 RX ADMIN — MIDODRINE HYDROCHLORIDE 10 MG: 10 TABLET ORAL at 18:37

## 2019-01-01 RX ADMIN — LUBIPROSTONE 8 MCG: 8 CAPSULE, GELATIN COATED ORAL at 10:15

## 2019-01-01 RX ADMIN — WARFARIN SODIUM 2 MG: 2 TABLET ORAL at 18:35

## 2019-01-01 RX ADMIN — OXYCODONE HYDROCHLORIDE 5 MG: 5 TABLET ORAL at 02:06

## 2019-01-01 RX ADMIN — PROCHLORPERAZINE MALEATE 10 MG: 10 TABLET, FILM COATED ORAL at 21:59

## 2019-01-01 RX ADMIN — BUMETANIDE 1 MG: 0.25 INJECTION INTRAMUSCULAR; INTRAVENOUS at 10:06

## 2019-01-01 RX ADMIN — PROPOFOL 10 MG: 10 INJECTION, EMULSION INTRAVENOUS at 14:25

## 2019-01-01 RX ADMIN — SODIUM CHLORIDE: 9 INJECTION, SOLUTION INTRAVENOUS at 02:40

## 2019-01-01 RX ADMIN — SODIUM CHLORIDE: 9 INJECTION, SOLUTION INTRAVENOUS at 23:49

## 2019-01-01 RX ADMIN — Medication 10 ML: at 08:56

## 2019-01-01 RX ADMIN — PROPOFOL 40 MG: 10 INJECTION, EMULSION INTRAVENOUS at 15:16

## 2019-01-01 RX ADMIN — Medication 10 ML: at 08:59

## 2019-01-01 RX ADMIN — BUMETANIDE 1 MG: 0.25 INJECTION INTRAMUSCULAR; INTRAVENOUS at 18:36

## 2019-01-01 RX ADMIN — WARFARIN SODIUM 2 MG: 2 TABLET ORAL at 18:15

## 2019-01-01 RX ADMIN — BUMETANIDE 1 MG: 0.25 INJECTION INTRAMUSCULAR; INTRAVENOUS at 10:04

## 2019-01-01 RX ADMIN — CEFTRIAXONE SODIUM 1 G: 1 INJECTION, POWDER, FOR SOLUTION INTRAMUSCULAR; INTRAVENOUS at 18:31

## 2019-01-01 RX ADMIN — HYDROMORPHONE HYDROCHLORIDE 1 MG: 2 TABLET ORAL at 04:29

## 2019-01-01 RX ADMIN — SENNOSIDES AND DOCUSATE SODIUM 1 TABLET: 8.6; 5 TABLET ORAL at 09:15

## 2019-01-01 RX ADMIN — HYDROMORPHONE HYDROCHLORIDE 1 MG: 2 TABLET ORAL at 13:44

## 2019-01-01 RX ADMIN — SODIUM CHLORIDE: 9 INJECTION, SOLUTION INTRAVENOUS at 00:19

## 2019-01-01 RX ADMIN — ALBUMIN (HUMAN) 25 G: 0.25 INJECTION, SOLUTION INTRAVENOUS at 08:56

## 2019-01-01 RX ADMIN — MIDODRINE HYDROCHLORIDE 10 MG: 10 TABLET ORAL at 17:40

## 2019-01-01 RX ADMIN — BUMETANIDE 1 MG: 0.25 INJECTION INTRAMUSCULAR; INTRAVENOUS at 18:31

## 2019-01-01 RX ADMIN — LUBIPROSTONE 8 MCG: 8 CAPSULE, GELATIN COATED ORAL at 18:16

## 2019-01-01 RX ADMIN — Medication 25 MCG: at 15:10

## 2019-01-01 RX ADMIN — MULTIPLE VITAMINS W/ MINERALS TAB 1 TABLET: TAB at 10:15

## 2019-01-01 RX ADMIN — LUBIPROSTONE 8 MCG: 8 CAPSULE, GELATIN COATED ORAL at 08:58

## 2019-01-01 RX ADMIN — LUBIPROSTONE 8 MCG: 8 CAPSULE, GELATIN COATED ORAL at 09:26

## 2019-01-01 RX ADMIN — PANTOPRAZOLE SODIUM 40 MG: 40 TABLET, DELAYED RELEASE ORAL at 17:12

## 2019-01-01 RX ADMIN — CEFTRIAXONE SODIUM 1 G: 1 INJECTION, POWDER, FOR SOLUTION INTRAMUSCULAR; INTRAVENOUS at 18:07

## 2019-01-01 RX ADMIN — OXYCODONE HYDROCHLORIDE 5 MG: 5 TABLET ORAL at 06:19

## 2019-01-01 RX ADMIN — LACTULOSE 20 G: 20 SOLUTION ORAL at 08:56

## 2019-01-01 RX ADMIN — ALBUMIN (HUMAN) 25 G: 0.25 INJECTION, SOLUTION INTRAVENOUS at 10:04

## 2019-01-01 RX ADMIN — PANTOPRAZOLE SODIUM 40 MG: 40 TABLET, DELAYED RELEASE ORAL at 06:03

## 2019-01-01 RX ADMIN — CEFTRIAXONE SODIUM 1 G: 1 INJECTION, POWDER, FOR SOLUTION INTRAMUSCULAR; INTRAVENOUS at 17:40

## 2019-01-01 RX ADMIN — Medication 50 MCG: at 14:54

## 2019-01-01 RX ADMIN — OXYCODONE HYDROCHLORIDE 5 MG: 5 TABLET ORAL at 16:26

## 2019-01-01 RX ADMIN — HYDROMORPHONE HYDROCHLORIDE 1 MG: 2 TABLET ORAL at 22:28

## 2019-01-01 RX ADMIN — MULTIPLE VITAMINS W/ MINERALS TAB 1 TABLET: TAB at 09:15

## 2019-01-01 RX ADMIN — LUBIPROSTONE 8 MCG: 8 CAPSULE, GELATIN COATED ORAL at 18:53

## 2019-01-01 RX ADMIN — CEFTRIAXONE SODIUM 1 G: 1 INJECTION, POWDER, FOR SOLUTION INTRAMUSCULAR; INTRAVENOUS at 18:41

## 2019-01-01 RX ADMIN — MULTIPLE VITAMINS W/ MINERALS TAB 1 TABLET: TAB at 09:54

## 2019-01-01 RX ADMIN — HYDROMORPHONE HYDROCHLORIDE 1 MG: 2 TABLET ORAL at 02:56

## 2019-01-01 RX ADMIN — LIDOCAINE HYDROCHLORIDE: 20 JELLY TOPICAL at 23:29

## 2019-01-01 RX ADMIN — WARFARIN SODIUM 2 MG: 2 TABLET ORAL at 16:57

## 2019-01-01 RX ADMIN — OXYCODONE HYDROCHLORIDE 5 MG: 5 TABLET ORAL at 09:46

## 2019-01-01 RX ADMIN — HYDROMORPHONE HYDROCHLORIDE 1 MG: 2 TABLET ORAL at 04:30

## 2019-01-01 RX ADMIN — HYDROMORPHONE HYDROCHLORIDE 1 MG: 2 TABLET ORAL at 09:04

## 2019-01-01 RX ADMIN — HYDROMORPHONE HYDROCHLORIDE 1 MG: 2 TABLET ORAL at 23:59

## 2019-01-01 RX ADMIN — MIDAZOLAM 2 MG: 1 INJECTION INTRAMUSCULAR; INTRAVENOUS at 14:48

## 2019-01-01 RX ADMIN — MULTIPLE VITAMINS W/ MINERALS TAB 1 TABLET: TAB at 08:55

## 2019-01-01 RX ADMIN — SENNOSIDES AND DOCUSATE SODIUM 1 TABLET: 8.6; 5 TABLET ORAL at 11:37

## 2019-01-01 RX ADMIN — MULTIPLE VITAMINS W/ MINERALS TAB 1 TABLET: TAB at 08:24

## 2019-01-01 RX ADMIN — BUMETANIDE 1 MG: 0.25 INJECTION INTRAMUSCULAR; INTRAVENOUS at 13:10

## 2019-01-01 RX ADMIN — SENNOSIDES AND DOCUSATE SODIUM 1 TABLET: 8.6; 5 TABLET ORAL at 21:12

## 2019-01-01 RX ADMIN — CEFTRIAXONE SODIUM 1 G: 1 INJECTION, POWDER, FOR SOLUTION INTRAMUSCULAR; INTRAVENOUS at 16:37

## 2019-01-01 RX ADMIN — IRON SUCROSE 200 MG: 20 INJECTION, SOLUTION INTRAVENOUS at 15:49

## 2019-01-01 RX ADMIN — SODIUM CHLORIDE: 9 INJECTION, SOLUTION INTRAVENOUS at 14:45

## 2019-01-01 RX ADMIN — MIDODRINE HYDROCHLORIDE 10 MG: 10 TABLET ORAL at 13:25

## 2019-01-01 RX ADMIN — HYDROMORPHONE HYDROCHLORIDE 1 MG: 2 TABLET ORAL at 20:31

## 2019-01-01 RX ADMIN — BUMETANIDE 1 MG: 0.25 INJECTION INTRAMUSCULAR; INTRAVENOUS at 02:03

## 2019-01-01 RX ADMIN — OXYCODONE HYDROCHLORIDE 5 MG: 5 TABLET ORAL at 16:13

## 2019-01-01 RX ADMIN — CEFTRIAXONE SODIUM 1 G: 1 INJECTION, POWDER, FOR SOLUTION INTRAMUSCULAR; INTRAVENOUS at 18:00

## 2019-01-01 RX ADMIN — LACTULOSE 20 G: 20 SOLUTION ORAL at 22:10

## 2019-01-01 RX ADMIN — LACTULOSE 20 G: 20 SOLUTION ORAL at 15:42

## 2019-01-01 RX ADMIN — FUROSEMIDE 20 MG: 10 INJECTION, SOLUTION INTRAMUSCULAR; INTRAVENOUS at 02:51

## 2019-01-01 RX ADMIN — CEFTRIAXONE SODIUM 1 G: 1 INJECTION, POWDER, FOR SOLUTION INTRAMUSCULAR; INTRAVENOUS at 19:16

## 2019-01-01 RX ADMIN — HYDROMORPHONE HYDROCHLORIDE 1 MG: 2 TABLET ORAL at 19:14

## 2019-01-01 RX ADMIN — ALBUMIN (HUMAN) 25 G: 0.25 INJECTION, SOLUTION INTRAVENOUS at 09:36

## 2019-01-01 RX ADMIN — PROCHLORPERAZINE MALEATE 10 MG: 10 TABLET, FILM COATED ORAL at 09:15

## 2019-01-01 RX ADMIN — LUBIPROSTONE 8 MCG: 8 CAPSULE, GELATIN COATED ORAL at 18:29

## 2019-01-01 RX ADMIN — OXYCODONE HYDROCHLORIDE 5 MG: 5 TABLET ORAL at 23:12

## 2019-01-01 RX ADMIN — OXYCODONE HYDROCHLORIDE 5 MG: 5 TABLET ORAL at 22:10

## 2019-01-01 RX ADMIN — OXYCODONE HYDROCHLORIDE 5 MG: 5 TABLET ORAL at 23:37

## 2019-01-01 RX ADMIN — MULTIPLE VITAMINS W/ MINERALS TAB 1 TABLET: TAB at 10:11

## 2019-01-01 RX ADMIN — OXYCODONE HYDROCHLORIDE 5 MG: 5 TABLET ORAL at 00:46

## 2019-01-01 RX ADMIN — BUMETANIDE 1 MG: 0.25 INJECTION INTRAMUSCULAR; INTRAVENOUS at 11:29

## 2019-01-01 RX ADMIN — OXYCODONE HYDROCHLORIDE 5 MG: 5 TABLET ORAL at 17:29

## 2019-01-01 RX ADMIN — SODIUM CHLORIDE 250 ML: 9 INJECTION, SOLUTION INTRAVENOUS at 23:00

## 2019-01-01 RX ADMIN — Medication 100 MCG: at 14:53

## 2019-01-01 RX ADMIN — BUMETANIDE 1 MG: 0.25 INJECTION INTRAMUSCULAR; INTRAVENOUS at 08:58

## 2019-01-01 RX ADMIN — OXYCODONE HYDROCHLORIDE 5 MG: 5 TABLET ORAL at 11:44

## 2019-01-01 RX ADMIN — Medication 10 ML: at 21:08

## 2019-01-01 RX ADMIN — SODIUM CHLORIDE: 9 INJECTION, SOLUTION INTRAVENOUS at 20:05

## 2019-01-01 RX ADMIN — OXYCODONE HYDROCHLORIDE 5 MG: 5 TABLET ORAL at 22:09

## 2019-01-01 RX ADMIN — OXYCODONE HYDROCHLORIDE 5 MG: 5 TABLET ORAL at 11:37

## 2019-01-01 RX ADMIN — MULTIPLE VITAMINS W/ MINERALS TAB 1 TABLET: TAB at 08:15

## 2019-01-01 RX ADMIN — HYDROMORPHONE HYDROCHLORIDE 1 MG: 1 INJECTION, SOLUTION INTRAMUSCULAR; INTRAVENOUS; SUBCUTANEOUS at 22:14

## 2019-01-01 RX ADMIN — LUBIPROSTONE 8 MCG: 8 CAPSULE, GELATIN COATED ORAL at 18:07

## 2019-01-01 RX ADMIN — MIDODRINE HYDROCHLORIDE 5 MG: 5 TABLET ORAL at 18:07

## 2019-01-01 RX ADMIN — ALBUMIN (HUMAN) 25 G: 0.25 INJECTION, SOLUTION INTRAVENOUS at 10:15

## 2019-01-01 RX ADMIN — LUBIPROSTONE 8 MCG: 8 CAPSULE, GELATIN COATED ORAL at 18:36

## 2019-01-01 RX ADMIN — CEFTRIAXONE SODIUM 1 G: 1 INJECTION, POWDER, FOR SOLUTION INTRAMUSCULAR; INTRAVENOUS at 18:14

## 2019-01-01 RX ADMIN — Medication 10 ML: at 23:36

## 2019-01-01 RX ADMIN — Medication 10 ML: at 22:10

## 2019-01-01 RX ADMIN — LACTULOSE 20 G: 20 SOLUTION ORAL at 08:15

## 2019-01-01 RX ADMIN — OXYCODONE HYDROCHLORIDE 5 MG: 5 TABLET ORAL at 00:47

## 2019-01-01 RX ADMIN — HYDROMORPHONE HYDROCHLORIDE 1 MG: 2 TABLET ORAL at 08:26

## 2019-01-01 RX ADMIN — HYDROMORPHONE HYDROCHLORIDE 1 MG: 2 TABLET ORAL at 10:11

## 2019-01-01 RX ADMIN — MULTIPLE VITAMINS W/ MINERALS TAB 1 TABLET: TAB at 09:03

## 2019-01-01 RX ADMIN — OXYCODONE HYDROCHLORIDE 5 MG: 5 TABLET ORAL at 07:07

## 2019-01-01 RX ADMIN — LUBIPROSTONE 8 MCG: 8 CAPSULE, GELATIN COATED ORAL at 17:33

## 2019-01-01 RX ADMIN — AMOXICILLIN 500 MG: 500 CAPSULE ORAL at 09:11

## 2019-01-01 RX ADMIN — MULTIPLE VITAMINS W/ MINERALS TAB 1 TABLET: TAB at 07:59

## 2019-01-01 RX ADMIN — WARFARIN SODIUM 2.5 MG: 2.5 TABLET ORAL at 17:54

## 2019-01-01 RX ADMIN — LUBIPROSTONE 8 MCG: 8 CAPSULE, GELATIN COATED ORAL at 08:24

## 2019-01-01 RX ADMIN — SENNOSIDES AND DOCUSATE SODIUM 1 TABLET: 8.6; 5 TABLET ORAL at 08:26

## 2019-01-01 RX ADMIN — FUROSEMIDE 20 MG: 10 INJECTION, SOLUTION INTRAMUSCULAR; INTRAVENOUS at 13:08

## 2019-01-01 RX ADMIN — HYDROMORPHONE HYDROCHLORIDE 1 MG: 2 TABLET ORAL at 13:47

## 2019-01-01 RX ADMIN — ALBUMIN (HUMAN) 25 G: 0.25 INJECTION, SOLUTION INTRAVENOUS at 10:05

## 2019-01-01 RX ADMIN — WARFARIN SODIUM 2.5 MG: 2.5 TABLET ORAL at 18:41

## 2019-01-01 RX ADMIN — ALBUMIN (HUMAN) 25 G: 0.25 INJECTION, SOLUTION INTRAVENOUS at 08:57

## 2019-01-01 RX ADMIN — PROCHLORPERAZINE MALEATE 10 MG: 10 TABLET, FILM COATED ORAL at 16:26

## 2019-01-01 RX ADMIN — OXYCODONE HYDROCHLORIDE 5 MG: 5 TABLET ORAL at 09:12

## 2019-01-01 RX ADMIN — Medication 10 ML: at 09:06

## 2019-01-01 RX ADMIN — MIDODRINE HYDROCHLORIDE 10 MG: 10 TABLET ORAL at 08:13

## 2019-01-01 RX ADMIN — SODIUM CHLORIDE: 9 INJECTION, SOLUTION INTRAVENOUS at 09:49

## 2019-01-01 RX ADMIN — IRON SUCROSE 200 MG: 20 INJECTION, SOLUTION INTRAVENOUS at 17:30

## 2019-01-01 RX ADMIN — PROPOFOL 20 MG: 10 INJECTION, EMULSION INTRAVENOUS at 14:21

## 2019-01-01 RX ADMIN — BUMETANIDE 1 MG: 0.25 INJECTION INTRAMUSCULAR; INTRAVENOUS at 22:09

## 2019-01-01 RX ADMIN — OXYCODONE HYDROCHLORIDE 5 MG: 5 TABLET ORAL at 14:13

## 2019-01-01 RX ADMIN — LUBIPROSTONE 8 MCG: 8 CAPSULE, GELATIN COATED ORAL at 09:15

## 2019-01-01 RX ADMIN — OXYCODONE HYDROCHLORIDE 5 MG: 5 TABLET ORAL at 00:24

## 2019-01-01 RX ADMIN — LACTULOSE 20 G: 20 SOLUTION ORAL at 21:49

## 2019-01-01 RX ADMIN — MIDODRINE HYDROCHLORIDE 5 MG: 5 TABLET ORAL at 08:58

## 2019-01-01 RX ADMIN — LUBIPROSTONE 8 MCG: 8 CAPSULE, GELATIN COATED ORAL at 17:54

## 2019-01-01 RX ADMIN — PROPOFOL 130 MCG/KG/MIN: 10 INJECTION, EMULSION INTRAVENOUS at 14:03

## 2019-01-01 RX ADMIN — OXYCODONE HYDROCHLORIDE 5 MG: 5 TABLET ORAL at 08:58

## 2019-01-01 RX ADMIN — SENNOSIDES AND DOCUSATE SODIUM 1 TABLET: 8.6; 5 TABLET ORAL at 10:35

## 2019-01-01 ASSESSMENT — PAIN SCALES - GENERAL
PAINLEVEL_OUTOF10: 6
PAINLEVEL_OUTOF10: 8
PAINLEVEL_OUTOF10: 3
PAINLEVEL_OUTOF10: 8
PAINLEVEL_OUTOF10: 7
PAINLEVEL_OUTOF10: 6
PAINLEVEL_OUTOF10: 6
PAINLEVEL_OUTOF10: 10
PAINLEVEL_OUTOF10: 7
PAINLEVEL_OUTOF10: 4
PAINLEVEL_OUTOF10: 7
PAINLEVEL_OUTOF10: 5
PAINLEVEL_OUTOF10: 6
PAINLEVEL_OUTOF10: 0
PAINLEVEL_OUTOF10: 0
PAINLEVEL_OUTOF10: 5
PAINLEVEL_OUTOF10: 3
PAINLEVEL_OUTOF10: 7
PAINLEVEL_OUTOF10: 3
PAINLEVEL_OUTOF10: 8
PAINLEVEL_OUTOF10: 4
PAINLEVEL_OUTOF10: 5
PAINLEVEL_OUTOF10: 5
PAINLEVEL_OUTOF10: 9
PAINLEVEL_OUTOF10: 5
PAINLEVEL_OUTOF10: 5
PAINLEVEL_OUTOF10: 4
PAINLEVEL_OUTOF10: 6
PAINLEVEL_OUTOF10: 5
PAINLEVEL_OUTOF10: 3
PAINLEVEL_OUTOF10: 6
PAINLEVEL_OUTOF10: 2
PAINLEVEL_OUTOF10: 5
PAINLEVEL_OUTOF10: 8
PAINLEVEL_OUTOF10: 2
PAINLEVEL_OUTOF10: 0
PAINLEVEL_OUTOF10: 4
PAINLEVEL_OUTOF10: 9
PAINLEVEL_OUTOF10: 0
PAINLEVEL_OUTOF10: 8
PAINLEVEL_OUTOF10: 6
PAINLEVEL_OUTOF10: 2
PAINLEVEL_OUTOF10: 6
PAINLEVEL_OUTOF10: 8
PAINLEVEL_OUTOF10: 9
PAINLEVEL_OUTOF10: 6
PAINLEVEL_OUTOF10: 7
PAINLEVEL_OUTOF10: 7
PAINLEVEL_OUTOF10: 4
PAINLEVEL_OUTOF10: 0
PAINLEVEL_OUTOF10: 4
PAINLEVEL_OUTOF10: 6
PAINLEVEL_OUTOF10: 5
PAINLEVEL_OUTOF10: 0
PAINLEVEL_OUTOF10: 4
PAINLEVEL_OUTOF10: 6
PAINLEVEL_OUTOF10: 7
PAINLEVEL_OUTOF10: 7
PAINLEVEL_OUTOF10: 5
PAINLEVEL_OUTOF10: 9
PAINLEVEL_OUTOF10: 4
PAINLEVEL_OUTOF10: 6
PAINLEVEL_OUTOF10: 0
PAINLEVEL_OUTOF10: 4
PAINLEVEL_OUTOF10: 9
PAINLEVEL_OUTOF10: 6
PAINLEVEL_OUTOF10: 5
PAINLEVEL_OUTOF10: 6
PAINLEVEL_OUTOF10: 8
PAINLEVEL_OUTOF10: 8
PAINLEVEL_OUTOF10: 4
PAINLEVEL_OUTOF10: 3
PAINLEVEL_OUTOF10: 4
PAINLEVEL_OUTOF10: 5
PAINLEVEL_OUTOF10: 4
PAINLEVEL_OUTOF10: 6
PAINLEVEL_OUTOF10: 5
PAINLEVEL_OUTOF10: 5
PAINLEVEL_OUTOF10: 6
PAINLEVEL_OUTOF10: 5
PAINLEVEL_OUTOF10: 8
PAINLEVEL_OUTOF10: 4
PAINLEVEL_OUTOF10: 7
PAINLEVEL_OUTOF10: 4
PAINLEVEL_OUTOF10: 8
PAINLEVEL_OUTOF10: 0
PAINLEVEL_OUTOF10: 7
PAINLEVEL_OUTOF10: 4
PAINLEVEL_OUTOF10: 8
PAINLEVEL_OUTOF10: 0
PAINLEVEL_OUTOF10: 4
PAINLEVEL_OUTOF10: 3
PAINLEVEL_OUTOF10: 5
PAINLEVEL_OUTOF10: 0
PAINLEVEL_OUTOF10: 0
PAINLEVEL_OUTOF10: 10
PAINLEVEL_OUTOF10: 4
PAINLEVEL_OUTOF10: 7
PAINLEVEL_OUTOF10: 4
PAINLEVEL_OUTOF10: 6
PAINLEVEL_OUTOF10: 7
PAINLEVEL_OUTOF10: 7
PAINLEVEL_OUTOF10: 4
PAINLEVEL_OUTOF10: 8
PAINLEVEL_OUTOF10: 10
PAINLEVEL_OUTOF10: 8
PAINLEVEL_OUTOF10: 6
PAINLEVEL_OUTOF10: 0
PAINLEVEL_OUTOF10: 4
PAINLEVEL_OUTOF10: 3
PAINLEVEL_OUTOF10: 5
PAINLEVEL_OUTOF10: 7
PAINLEVEL_OUTOF10: 6
PAINLEVEL_OUTOF10: 5
PAINLEVEL_OUTOF10: 5
PAINLEVEL_OUTOF10: 0
PAINLEVEL_OUTOF10: 7
PAINLEVEL_OUTOF10: 4
PAINLEVEL_OUTOF10: 7

## 2019-01-01 ASSESSMENT — PAIN DESCRIPTION - ORIENTATION
ORIENTATION: RIGHT
ORIENTATION: RIGHT
ORIENTATION: RIGHT;LEFT;LOWER
ORIENTATION: LOWER;MID
ORIENTATION: RIGHT
ORIENTATION: UPPER
ORIENTATION: RIGHT;LEFT;UPPER
ORIENTATION: RIGHT;LEFT
ORIENTATION: UPPER
ORIENTATION: LOWER;UPPER
ORIENTATION: UPPER
ORIENTATION: RIGHT
ORIENTATION: RIGHT
ORIENTATION: UPPER
ORIENTATION: RIGHT;UPPER
ORIENTATION: MID
ORIENTATION: RIGHT
ORIENTATION: MID
ORIENTATION: UPPER;RIGHT
ORIENTATION: RIGHT
ORIENTATION: UPPER
ORIENTATION: UPPER
ORIENTATION: RIGHT;LEFT
ORIENTATION: RIGHT;LEFT
ORIENTATION: UPPER
ORIENTATION: RIGHT;LEFT
ORIENTATION: RIGHT;LEFT
ORIENTATION: RIGHT
ORIENTATION: RIGHT;LEFT;UPPER
ORIENTATION: RIGHT;LEFT;UPPER
ORIENTATION: RIGHT;LEFT
ORIENTATION: RIGHT
ORIENTATION: RIGHT

## 2019-01-01 ASSESSMENT — PAIN DESCRIPTION - ONSET
ONSET: ON-GOING
ONSET: GRADUAL
ONSET: GRADUAL
ONSET: ON-GOING
ONSET: GRADUAL
ONSET: ON-GOING
ONSET: GRADUAL
ONSET: ON-GOING
ONSET: GRADUAL
ONSET: ON-GOING

## 2019-01-01 ASSESSMENT — PAIN DESCRIPTION - PAIN TYPE
TYPE: ACUTE PAIN;CHRONIC PAIN
TYPE: CHRONIC PAIN
TYPE: CHRONIC PAIN;ACUTE PAIN
TYPE: ACUTE PAIN;CHRONIC PAIN
TYPE: ACUTE PAIN
TYPE: ACUTE PAIN
TYPE: ACUTE PAIN;CHRONIC PAIN
TYPE: ACUTE PAIN
TYPE: ACUTE PAIN
TYPE: ACUTE PAIN;CHRONIC PAIN
TYPE: ACUTE PAIN
TYPE: ACUTE PAIN;SURGICAL PAIN
TYPE: CHRONIC PAIN
TYPE: ACUTE PAIN
TYPE: ACUTE PAIN;CHRONIC PAIN
TYPE: ACUTE PAIN
TYPE: CHRONIC PAIN
TYPE: ACUTE PAIN
TYPE: ACUTE PAIN;CHRONIC PAIN
TYPE: CHRONIC PAIN
TYPE: ACUTE PAIN
TYPE: ACUTE PAIN
TYPE: ACUTE PAIN;CHRONIC PAIN
TYPE: ACUTE PAIN
TYPE: ACUTE PAIN;CHRONIC PAIN
TYPE: ACUTE PAIN;CHRONIC PAIN
TYPE: ACUTE PAIN
TYPE: ACUTE PAIN
TYPE: CHRONIC PAIN
TYPE: ACUTE PAIN;CHRONIC PAIN
TYPE: CHRONIC PAIN

## 2019-01-01 ASSESSMENT — PULMONARY FUNCTION TESTS
PIF_VALUE: 1
PIF_VALUE: 0
PIF_VALUE: 0
PIF_VALUE: 1
PIF_VALUE: 1
PIF_VALUE: 0
PIF_VALUE: 1
PIF_VALUE: 0
PIF_VALUE: 1
PIF_VALUE: 1
PIF_VALUE: 0
PIF_VALUE: 1
PIF_VALUE: 1
PIF_VALUE: 0
PIF_VALUE: 1
PIF_VALUE: 0
PIF_VALUE: 1
PIF_VALUE: 1
PIF_VALUE: 0
PIF_VALUE: 1
PIF_VALUE: 0
PIF_VALUE: 1
PIF_VALUE: 0
PIF_VALUE: 1
PIF_VALUE: 0
PIF_VALUE: 0
PIF_VALUE: 1
PIF_VALUE: 0
PIF_VALUE: 1
PIF_VALUE: 0
PIF_VALUE: 1
PIF_VALUE: 0
PIF_VALUE: 1
PIF_VALUE: 0
PIF_VALUE: 1
PIF_VALUE: 1
PIF_VALUE: 0
PIF_VALUE: 0
PIF_VALUE: 1
PIF_VALUE: 0
PIF_VALUE: 1
PIF_VALUE: 0
PIF_VALUE: 1
PIF_VALUE: 0
PIF_VALUE: 1

## 2019-01-01 ASSESSMENT — PAIN DESCRIPTION - LOCATION
LOCATION: FOOT;LEG
LOCATION: ABDOMEN
LOCATION: GENERALIZED
LOCATION: ABDOMEN
LOCATION: CHEST
LOCATION: ABDOMEN
LOCATION: BACK;ABDOMEN
LOCATION: ABDOMEN
LOCATION: ABDOMEN
LOCATION: FOOT;LEG
LOCATION: ABDOMEN
LOCATION: ABDOMEN;BACK
LOCATION: SHOULDER
LOCATION: HAND;LEG
LOCATION: FOOT;LEG
LOCATION: ABDOMEN
LOCATION: FOOT;LEG
LOCATION: ABDOMEN
LOCATION: SHOULDER
LOCATION: HIP;LEG
LOCATION: ABDOMEN;FLANK
LOCATION: ABDOMEN
LOCATION: HIP
LOCATION: ABDOMEN;BACK
LOCATION: HIP;LEG;BUTTOCKS
LOCATION: ABDOMEN
LOCATION: BACK;CHEST;FLANK
LOCATION: ABDOMEN
LOCATION: ABDOMEN
LOCATION: FOOT;LEG

## 2019-01-01 ASSESSMENT — PAIN - FUNCTIONAL ASSESSMENT
PAIN_FUNCTIONAL_ASSESSMENT: ACTIVITIES ARE NOT PREVENTED
PAIN_FUNCTIONAL_ASSESSMENT: PREVENTS OR INTERFERES WITH ALL ACTIVE AND SOME PASSIVE ACTIVITIES
PAIN_FUNCTIONAL_ASSESSMENT: PREVENTS OR INTERFERES SOME ACTIVE ACTIVITIES AND ADLS
PAIN_FUNCTIONAL_ASSESSMENT: PREVENTS OR INTERFERES WITH MANY ACTIVE NOT PASSIVE ACTIVITIES
PAIN_FUNCTIONAL_ASSESSMENT: PREVENTS OR INTERFERES WITH MANY ACTIVE NOT PASSIVE ACTIVITIES
PAIN_FUNCTIONAL_ASSESSMENT: PREVENTS OR INTERFERES WITH ALL ACTIVE AND SOME PASSIVE ACTIVITIES
PAIN_FUNCTIONAL_ASSESSMENT: INTOLERABLE, UNABLE TO DO ANY ACTIVE OR PASSIVE ACTIVITIES
PAIN_FUNCTIONAL_ASSESSMENT: PREVENTS OR INTERFERES WITH MANY ACTIVE NOT PASSIVE ACTIVITIES
PAIN_FUNCTIONAL_ASSESSMENT: PREVENTS OR INTERFERES SOME ACTIVE ACTIVITIES AND ADLS
PAIN_FUNCTIONAL_ASSESSMENT: PREVENTS OR INTERFERES WITH MANY ACTIVE NOT PASSIVE ACTIVITIES
PAIN_FUNCTIONAL_ASSESSMENT: PREVENTS OR INTERFERES SOME ACTIVE ACTIVITIES AND ADLS
PAIN_FUNCTIONAL_ASSESSMENT: PREVENTS OR INTERFERES SOME ACTIVE ACTIVITIES AND ADLS
PAIN_FUNCTIONAL_ASSESSMENT: PREVENTS OR INTERFERES WITH ALL ACTIVE AND SOME PASSIVE ACTIVITIES
PAIN_FUNCTIONAL_ASSESSMENT: PREVENTS OR INTERFERES WITH MANY ACTIVE NOT PASSIVE ACTIVITIES
PAIN_FUNCTIONAL_ASSESSMENT: PREVENTS OR INTERFERES WITH MANY ACTIVE NOT PASSIVE ACTIVITIES
PAIN_FUNCTIONAL_ASSESSMENT: PREVENTS OR INTERFERES SOME ACTIVE ACTIVITIES AND ADLS
PAIN_FUNCTIONAL_ASSESSMENT: PREVENTS OR INTERFERES SOME ACTIVE ACTIVITIES AND ADLS
PAIN_FUNCTIONAL_ASSESSMENT: PREVENTS OR INTERFERES WITH MANY ACTIVE NOT PASSIVE ACTIVITIES
PAIN_FUNCTIONAL_ASSESSMENT: PREVENTS OR INTERFERES WITH MANY ACTIVE NOT PASSIVE ACTIVITIES
PAIN_FUNCTIONAL_ASSESSMENT: PREVENTS OR INTERFERES SOME ACTIVE ACTIVITIES AND ADLS

## 2019-01-01 ASSESSMENT — PAIN DESCRIPTION - PROGRESSION
CLINICAL_PROGRESSION: GRADUALLY WORSENING
CLINICAL_PROGRESSION: NOT CHANGED
CLINICAL_PROGRESSION: GRADUALLY IMPROVING
CLINICAL_PROGRESSION: NOT CHANGED
CLINICAL_PROGRESSION: GRADUALLY WORSENING
CLINICAL_PROGRESSION: NOT CHANGED
CLINICAL_PROGRESSION: GRADUALLY WORSENING
CLINICAL_PROGRESSION: NOT CHANGED
CLINICAL_PROGRESSION: GRADUALLY WORSENING
CLINICAL_PROGRESSION: GRADUALLY WORSENING
CLINICAL_PROGRESSION: NOT CHANGED
CLINICAL_PROGRESSION: NOT CHANGED
CLINICAL_PROGRESSION: GRADUALLY WORSENING
CLINICAL_PROGRESSION: RAPIDLY WORSENING
CLINICAL_PROGRESSION: NOT CHANGED
CLINICAL_PROGRESSION: GRADUALLY WORSENING
CLINICAL_PROGRESSION: NOT CHANGED
CLINICAL_PROGRESSION: GRADUALLY IMPROVING
CLINICAL_PROGRESSION: GRADUALLY WORSENING
CLINICAL_PROGRESSION: NOT CHANGED
CLINICAL_PROGRESSION: GRADUALLY IMPROVING
CLINICAL_PROGRESSION: NOT CHANGED
CLINICAL_PROGRESSION: GRADUALLY WORSENING
CLINICAL_PROGRESSION: NOT CHANGED
CLINICAL_PROGRESSION: GRADUALLY IMPROVING
CLINICAL_PROGRESSION: GRADUALLY IMPROVING
CLINICAL_PROGRESSION: GRADUALLY WORSENING
CLINICAL_PROGRESSION: NOT CHANGED
CLINICAL_PROGRESSION: GRADUALLY WORSENING
CLINICAL_PROGRESSION: GRADUALLY IMPROVING
CLINICAL_PROGRESSION: GRADUALLY IMPROVING
CLINICAL_PROGRESSION: GRADUALLY WORSENING
CLINICAL_PROGRESSION: NOT CHANGED
CLINICAL_PROGRESSION: GRADUALLY IMPROVING
CLINICAL_PROGRESSION: NOT CHANGED

## 2019-01-01 ASSESSMENT — ENCOUNTER SYMPTOMS
EYE PAIN: 0
COUGH: 0
BACK PAIN: 1
ABDOMINAL DISTENTION: 1
EYE REDNESS: 0
VOMITING: 0
SHORTNESS OF BREATH: 1
CHEST TIGHTNESS: 1
FACIAL SWELLING: 0
DIARRHEA: 0
EYE REDNESS: 0
WHEEZING: 0
NAUSEA: 0
VOMITING: 0
COUGH: 0
TROUBLE SWALLOWING: 0
BACK PAIN: 1
ABDOMINAL PAIN: 1
GASTROINTESTINAL NEGATIVE: 1
DIARRHEA: 0
COLOR CHANGE: 0
CONSTIPATION: 0
CONSTIPATION: 0
COLOR CHANGE: 0
EYE DISCHARGE: 0
ABDOMINAL PAIN: 1
EYE DISCHARGE: 0
SORE THROAT: 0
RESPIRATORY NEGATIVE: 1
SHORTNESS OF BREATH: 0

## 2019-01-01 ASSESSMENT — PAIN DESCRIPTION - FREQUENCY
FREQUENCY: CONTINUOUS
FREQUENCY: INTERMITTENT
FREQUENCY: INTERMITTENT
FREQUENCY: CONTINUOUS
FREQUENCY: INTERMITTENT
FREQUENCY: CONTINUOUS
FREQUENCY: INTERMITTENT
FREQUENCY: CONTINUOUS
FREQUENCY: INTERMITTENT
FREQUENCY: CONTINUOUS
FREQUENCY: INTERMITTENT
FREQUENCY: CONTINUOUS
FREQUENCY: INTERMITTENT

## 2019-01-01 ASSESSMENT — PAIN DESCRIPTION - DESCRIPTORS
DESCRIPTORS: ACHING;DISCOMFORT
DESCRIPTORS: ACHING;DISCOMFORT
DESCRIPTORS: ACHING;CRAMPING;DISCOMFORT
DESCRIPTORS: ACHING;DISCOMFORT
DESCRIPTORS: SHARP;SPASM;DISCOMFORT
DESCRIPTORS: ACHING;DISCOMFORT
DESCRIPTORS: ACHING;CONSTANT;CRAMPING;DISCOMFORT
DESCRIPTORS: ACHING;DISCOMFORT
DESCRIPTORS: ACHING;DISCOMFORT
DESCRIPTORS: ACHING;DISCOMFORT;DULL
DESCRIPTORS: ACHING
DESCRIPTORS: ACHING;SPASM
DESCRIPTORS: ACHING;DISCOMFORT
DESCRIPTORS: ACHING
DESCRIPTORS: ACHING;DISCOMFORT
DESCRIPTORS: SPASM;SHARP
DESCRIPTORS: SHARP;SHOOTING;ACHING
DESCRIPTORS: ACHING;DISCOMFORT
DESCRIPTORS: ACHING;DISCOMFORT;SORE
DESCRIPTORS: CRAMPING;DISCOMFORT
DESCRIPTORS: ACHING;DISCOMFORT
DESCRIPTORS: SHOOTING;SHARP
DESCRIPTORS: ACHING
DESCRIPTORS: ACHING;DISCOMFORT
DESCRIPTORS: ACHING
DESCRIPTORS: ACHING;DISCOMFORT
DESCRIPTORS: SHARP;DISCOMFORT;ACHING
DESCRIPTORS: ACHING;DISCOMFORT
DESCRIPTORS: ACHING
DESCRIPTORS: ACHING;CRAMPING;DISCOMFORT
DESCRIPTORS: ACHING;DISCOMFORT
DESCRIPTORS: ACHING
DESCRIPTORS: DISCOMFORT
DESCRIPTORS: ACHING;CRAMPING;DISCOMFORT
DESCRIPTORS: ACHING
DESCRIPTORS: SHARP;SHOOTING

## 2019-01-01 ASSESSMENT — PAIN DESCRIPTION - DIRECTION: RADIATING_TOWARDS: BACK

## 2019-01-11 PROBLEM — C25.0 CARCINOMA OF HEAD OF PANCREAS (HCC): Status: ACTIVE | Noted: 2019-01-01

## 2019-01-11 PROBLEM — C61 ADENOCARCINOMA OF PROSTATE (HCC): Status: ACTIVE | Noted: 2019-01-01

## 2019-04-25 NOTE — PROGRESS NOTES
[facility]  Office Progress Note  Today's Date and Time: 4/25/2019, 11:14 AM    Impression:     1. Prosthetic valve endocarditis, subsequent encounter    2. Malignant neoplasm of pancreas, unspecified location of malignancy ([address]    3. Lesion of liver    4. Intermittent fever    5. Dysuria         Recommendations   · The patient will continue on oral suppressive therapy with ampicillin for now. · Given the fever and chills there is concern that he could be developing recurrent endocarditis. · I will repeat blood cultures ×2 sets and also check UA and urine culture given the as the dysuria  · If any of these lab studies do show any positive findings I will call him and make further treatment arrangements at that time. I have ordered the following medications/ labs:  Orders Placed This Encounter   Procedures    Culture Blood #2     Standing Status:   Future     Standing Expiration Date:   5/30/2019    Culture Blood #1     Standing Status:   Future     Standing Expiration Date:   5/30/2019    Urine Culture     Standing Status:   Future     Standing Expiration Date:   5/30/2019     Order Specific Question:   Specify (ex-cath, midstream, cysto, etc)? Answer:   Midstream    Urinalysis with Microscopic     Standing Status:   Future     Standing Expiration Date:   5/30/2019     Order Specific Question:   SPECIFY(EX-CATH,MIDSTREAM,CYSTO,ETC)? Answer:   Midstream      No orders of the defined types were placed in this encounter. 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 The patient is on oral suppressive therapy with ampicillin and he does have underlying pancreatic cancer for which she has been followed by the hematology oncology service.   The patient in March 2019 was told that he had progressive disease as he was noted to have new liver lesions. The patient had been scheduled for a liver biopsy which she subsequently refused and he is not currently on chemotherapy as he would like to continue with holistic medicine. The patient is here today with his wife and he does reports that he had an episode of significant chills one night recently and he's had a few intermittent bouts of chills as well. He otherwise is feeling okay and reports his appetite has been good. He also reports some dysuria at times    I have personally reviewedthe past medical history, medications, social history, and I have updated the database accordingly. Past Medical History:     Past Medical History:   Diagnosis Date    Acid reflux     Arthritis     CAD (coronary artery disease)     Endocarditis     Enterococcal sepsis (HCC)     Pancreatic carcinoma (HCC)     Positive cardiac stress test     Prostate cancer (HCC)     Elevated PSA     Prosthetic valve endocarditis, subsequent encounter     Unspecified cerebral artery occlusion with cerebral infarction     TIA     Medications:     Current Outpatient Medications   Medication Sig Dispense Refill    amoxicillin (AMOXIL) 500 MG capsule Take 1 capsule by mouth 2 times daily 60 capsule 5    ferrous sulfate 325 (65 Fe) MG tablet Take 325 mg by mouth daily (with breakfast)      CALCIUM ASCORBATE PO Take by mouth      acetaminophen (TYLENOL) 500 MG tablet Take 650 mg by mouth every 8 hours as needed for Pain      warfarin (COUMADIN) 5 MG tablet Take 5 mg by mouth daily Indications: TAKES 1 OR 2 TABS DEPENDING ON INR i tab M/TH/F SAT/SUN  1 1/5 TU/ WED      Calcium Carb-Cholecalciferol (OYSTER SHELL CALCIUM/VITAMIN D) 500-200 MG-UNIT TABS Take 1 tablet by mouth daily      Multiple Vitamins-Minerals (THERAPEUTIC MULTIVITAMIN-MINERALS) tablet Take 1 tablet by mouth daily.       pantoprazole (PROTONIX) 40 MG tablet        No current

## 2019-07-10 PROBLEM — N39.0 URINARY TRACT INFECTION: Status: ACTIVE | Noted: 2019-01-01

## 2019-07-10 PROBLEM — N39.0 UTI (URINARY TRACT INFECTION): Status: ACTIVE | Noted: 2019-01-01

## 2019-07-10 NOTE — ED PROVIDER NOTES
occlusion with cerebral infarction     TIA       SURGICAL HISTORY           Procedure Laterality Date    AORTIC VALVE REPLACEMENT  1989    CARDIAC VALVE REPLACEMENT  0511,2183    AORTIC VALVE REPLACEMENT (PIG VALVE 1979,ST [facility])    COLONOSCOPY  4/5/2015    CYSTOSCOPY  8/18/15    CYSTOSCOPY  12/02/2016    no biopsy    HERNIA REPAIR  2/3600    UMBILICAL,EXCISION ON ABDOMINAL WALL CYST    OTHER SURGICAL HISTORY      ANAL FISSURE REPAIRED    TONSILLECTOMY           FAMILY HISTORY           Family history unknown: Yes     No family status information on file. SOCIAL HISTORY      reports that he has quit smoking. He has never used smokeless tobacco. He reports that he does not drink alcohol or use drugs. REVIEW OF SYSTEMS    (2-9 systems for level 4, 10 or more for level 5)     Review of Systems   Constitutional: Negative for chills, fatigue and fever. HENT: Negative for congestion, ear discharge and facial swelling. Eyes: Negative for discharge and redness. Respiratory: Negative for cough and shortness of breath. Cardiovascular: Negative for chest pain. Gastrointestinal: Positive for abdominal pain. Negative for constipation, diarrhea and vomiting. Genitourinary: Positive for hematuria. Negative for dysuria. Musculoskeletal: Positive for back pain. Negative for arthralgias. Skin: Negative for color change and rash. Neurological: Negative for syncope, numbness and headaches. Hematological: Negative for adenopathy. Psychiatric/Behavioral: Negative for confusion. The patient is not nervous/anxious. Except as noted above the remainder of the review of systems was reviewed and negative.      PHYSICAL EXAM    (up to 7 for level 4, 8 or more for level 5)     Vitals:    07/10/19 1557 07/10/19 1603   BP: 106/65    Pulse: 106    Resp: 18    Temp: 98.2 °F (36.8 °C)    SpO2: 100%    Weight:  159 lb 3 oz (72.2 kg)   Height:  5' 7\" (1.702 m)       Physical Exam atelectasis. There are small scattered nodules in the bilateral lung bases that or not clearly present on 07/28/2017. Coronary artery atherosclerotic vascular calcifications. Status post aortic valve replacement. Organs: Lack of intravenous contrast limits evaluation of the solid organs, vascular structures, and bowel. There are numerous large ill-defined hypodense lesions in the liver essentially replacing the left hepatic lobe and to a lesser degree involving the right hepatic lobe. The liver is enlarged with a nodular surface reflecting the underlying lesions. Status post cholecystectomy. No biliary ductal dilatation. Status post resection of the pancreatic head and uncinate process. The residual body and tail are grossly unremarkable. The spleen and bilateral adrenal glands are unremarkable. The bilateral kidneys and ureters are unremarkable. GI/Bowel: Normal appendix. The colon is unremarkable. Status post partial gastrectomy. No bowel obstruction or abnormal bowel wall thickening. Pelvis: 4 cm soft tissue density dependently within the urinary bladder lumen. No calcifications within the lumen. Prostatic radiation beads are noted. No free fluid in the pelvis. No pelvic or inguinal lymphadenopathy. Peritoneum/Retroperitoneum: The abdominal aorta is normal in caliber with moderate calcific plaquing. There is significant retroperitoneal lymphadenopathy new from 2017. This includes a 5.2 x 3.2 cm node on axial image 89. Small volume of ascites. No pneumoperitoneum. Bones/Soft Tissues: Postoperative scarring in the anterior abdominal wall. Status post median sternotomy. No acute or suspicious osseous abnormality. 1. Findings compatible with metastatic disease including numerous large ill-defined hepatic lesions, bulky retroperitoneal lymphadenopathy, and small scattered pulmonary nodules in the lung bases new from 2017. Small volume of ascites which may be malignant.  2. Status post partial pancreatectomy and gastrectomy. No bowel obstruction. 3. 4 cm soft tissue density within the urinary bladder lumen may represent clot with a soft tissue mass not excluded. No obstructive uropathy or urinary collecting system calculi. Consider further evaluation with cystoscopy. 4. Small left pleural effusion. LABS:  Labs Reviewed   CBC WITH AUTO DIFFERENTIAL - Abnormal; Notable for the following components:       Result Value    WBC 22.1 (*)     RBC 2.90 (*)     Hemoglobin 7.9 (*)     Hematocrit 25.8 (*)     RDW 17.0 (*)     Seg Neutrophils 81 (*)     Lymphocytes 6 (*)     Monocytes 10 (*)     Immature Granulocytes 1 (*)     Segs Absolute 17.90 (*)     Absolute Mono # 2.21 (*)     Basophils # 0.22 (*)     All other components within normal limits   BASIC METABOLIC PANEL - Abnormal; Notable for the following components:    Glucose 105 (*)     Bun/Cre Ratio 24 (*)     Calcium 8.1 (*)     Sodium 132 (*)     CO2 18 (*)     All other components within normal limits   PROTIME-INR - Abnormal; Notable for the following components:    Protime 18.0 (*)     All other components within normal limits   URINE CULTURE       All other labs were within normal range or not returned as of this dictation. EMERGENCY DEPARTMENT COURSE and DIFFERENTIAL DIAGNOSIS/MDM:   Vitals:    Vitals:    07/10/19 1557 07/10/19 1603   BP: 106/65    Pulse: 106    Resp: 18    Temp: 98.2 °F (36.8 °C)    SpO2: 100%    Weight:  159 lb 3 oz (72.2 kg)   Height:  5' 7\" (1.702 m)       Orders Placed This Encounter   Medications    cefTRIAXone (ROCEPHIN) 1 g IVPB in 50 mL D5W minibag       Medical Decision Making: White count is 22,000 and hemoglobin is down to 7.9. Hemoglobin was 12.8 in February of this year. Urine shows large amount of leukocytes and blood and is grossly bloody. Culture is ordered and the patient is being admitted and is given IV antibiotic. Urology consultation is also obtained.   Treatment diagnosis and disposition were

## 2019-07-11 PROBLEM — R31.0 GROSS HEMATURIA: Status: ACTIVE | Noted: 2019-01-01

## 2019-07-11 PROBLEM — D62 ACUTE BLOOD LOSS ANEMIA: Status: ACTIVE | Noted: 2019-01-01

## 2019-07-11 PROBLEM — C25.9 CANCER OF PANCREAS (HCC): Status: ACTIVE | Noted: 2019-01-01

## 2019-07-11 PROBLEM — C79.9 METASTASIS FROM PANCREATIC CANCER (HCC): Status: ACTIVE | Noted: 2019-01-01

## 2019-07-11 NOTE — CONSULTS
Normal   BILIRUBINUR NEGATIVE        -----------------------------------------------------------------  Imaging Results:Metastatic disease on CT scan documented partial pancreatectomy documented    Assessment and Plan   Impression: Gross hematuria severe anemia   Patient Active Problem List   Diagnosis    TIA (transient ischemic attack)    Rectal bleeding    Bilateral carotid artery stenosis    Arteriovenous malformation of colon    Disc disease, degenerative, cervical    Dizziness    S/P heart valve replacement with mechanical valve    Umbilical hernia    Hematuria    Dysuria    Radiation cystitis    Radiation proctitis    CHB (complete heart block) (HCC)    Adenocarcinoma of prostate ([address]    Carcinoma of head of pancreas ([address]    Urinary tract infection    UTI (urinary tract infection)    Gross hematuria    Acute blood loss anemia    Metastasis from pancreatic cancer [facility])       Plan: Maintain bladder irrigation, consultation with cardiology appreciated to help with decision making on anticoagulation therapy. Transfusion as needed. PSA at 0.4    The prostate cancer is not active at this time. Cystoscopy in a.m.  With cauterization of bleeders    Josh Ayala  7:10 PM 7/11/2019

## 2019-07-11 NOTE — CARE COORDINATION
Case Management Initial Discharge Plan  Maria Del Carmen Bang,         Readmission Risk              Risk of Unplanned Readmission:        13             Met with:patient or spouse/SO to discuss discharge plans. Information verified: address, contacts, phone number, , insurance Yes  PCP: Elidia Haines MD  Date of last visit:     Insurance Provider: [place] Roberson    Discharge Planning  Current Residence:  house  Living Arrangements:  Spouse/Significant Other, Children, Family Members   Home has 2 stories/1 stairs to climb  Support Systems:  Spouse/Significant Other, Family Members  Current Services PTA:  Home hospice Agency:  [facility]  Patient able to perform ADL's:Assisted  DME in home:  Braddock Golas, transport chair, cane, shower chir  DME used to aid ambulation prior to admission:    walker  DME used during admission:   none    Potential Assistance Needed:  N/A    Pharmacy:  [facility] at [address] [place] [address] Medications:  No  Does patient want to participate in local refill/ meds to beds program?  Yes    Patient agreeable to home care: Yes  Freedom of choice provided:  yes      Type of Home Care Services:  None  Patient expects to be discharged to:  home    Prior SNF/Rehab Placement and Facility:  none  Agreeable to SNF/Rehab: No  [place] of choice provided: n/a   Evaluation: no    Expected Discharge date:  19  Follow Up Appointment: Best Day/ Time:      Transportation provider: spouse  Transportation arrangements needed for discharge: No    Discharge Plan: Met with pt and spouse at bedside. Pt admitted with hematuria. Pt revoked his services with [address] for admission to the hospital.   Pt and spouse will not commit to returning home with hospice. Pt stating he felt there was a delay in getting treatment for his urinating blood clots for days. States he was instructed to stop his coumadin and next INR was not set up until he called to inquire about it.    Pt and spouse want to talk to Dr. Teresa Corral, Dr. Portia Mayberry and Dr. Luiza Munroe before deciding to return home with hospice. Follow POC for dc planning.          Electronically signed by Curtis Moore RN on 7/11/19 at 12:38 PM

## 2019-07-11 NOTE — PROGRESS NOTES
Pt admitted to room 2109 from ER. Oriented to room and call light/tv controls. Bed in lowest position, wheels locked, 2/4 side rails up  Call light in reach, room free of clutter, adequate lighting provided. Pt with generalized weakness, reports chronic pain to lower back \"3\", denies dysuria. Per ER RN Cristian Domingo, she said difficulty getting iv access, 3 ultrasound ivs have blown in ER. Rt upper arm iv sl swollen, watching closely. Wife at bedside.

## 2019-07-11 NOTE — PROGRESS NOTES
Transitions of Care Pharmacy Service   Medication Review    The patient's list of current home medications has been reviewed. Source(s) of information: patient, Marino vazquez      Other Notes Warfarin: pt gets 5mg tabs, states he was alternating 1 tab and 1.5 tabs (=7.5mg) daily at home prior to admission. Regimen is managed by cardiologist Dr. Luís Burgess office. Please feel free to call me with any questions about this encounter. Thank you. Lala Lam [address] [place] [address]   Transitions of Care Pharmacy Service  Phone:  [phone]  Fax: [phone]      Electronically signed by Lala Lam [address] [place] [address] on 7/11/2019 at 7:08 PM           Medications Prior to Admission:   senna-docusate (PERICOLACE) 8.6-50 MG per tablet, Take 1 tablet by mouth daily as needed for Constipation   oxyCODONE (ROXICODONE) 5 MG immediate release tablet, Take 5 mg by mouth every 4 hours as needed for Pain.  prochlorperazine (COMPAZINE) 10 MG tablet, Take 10 mg by mouth every 6 hours as needed (nausea)  HYDROmorphone (DILAUDID) 2 MG tablet, Take 1 mg by mouth every 2 hours as needed for Pain. Takes 1/2 tab (=1mg) prn  amoxicillin (AMOXIL) 500 MG capsule, Take 1 capsule by mouth 2 times daily  acetaminophen (TYLENOL) 500 MG tablet, Take 650 mg by mouth every 8 hours as needed for Pain  warfarin (COUMADIN) 5 MG tablet, Take 5-7.5 mg by mouth daily   Multiple Vitamins-Minerals (THERAPEUTIC MULTIVITAMIN-MINERALS) tablet, Take 1 tablet by mouth daily.

## 2019-07-11 NOTE — H&P
activity: Not on file   Other Topics Concern    Not on file   Social History Narrative    Not on file       Family History:   None in record. REVIEW OF SYSTEMS:  CONSTITUTIONAL:  negative for  fevers and chills. Positive for generalized weakness/fatigue. EYES:  negative for  double vision, blurred vision, dry eyes and blind spots  HEENT:  negative for  hearing loss, tinnitus, ear drainage, earaches, nasal congestion and epistaxis  RESPIRATORY:  negative for  cough with sputum, wheezing, hemoptysis and pleuritic pain. Dyspnea on exertion. CARDIOVASCULAR:  negative for  chest pain, palpitations, orthopnea, PND, exertional chest pressure/discomfort, syncope  GASTROINTESTINAL:  negative for nausea, vomiting, change in bowel habits, hematemesis and hemtochezia  GENITOURINARY:  See present illness. INTEGUMENT/BREAST:  negative for rash, skin lesion(s) and skin color change  HEMATOLOGIC/LYMPHATIC:  negative for easy bruising. Gross hematuria. ALLERGIC/IMMUNOLOGIC:  No known allergies. ENDOCRINE:  negative for heat intolerance, cold intolerance and tremor  MUSCULOSKELETAL:  negative for  arthralgias and joint swelling  NEUROLOGICAL:  negative for headaches, dizziness, seizures, memory problems and speech problems  BEHAVIOR/PSYCH:  negative for depressed mood    PHYSICAL EXAM:  Vitals:  BP (!) 106/58   Pulse 88   Temp 97.7 °F (36.5 °C) (Oral)   Resp 16   Ht 5' 7\" (1.702 m)   Wt 162 lb 2 oz (73.5 kg)   SpO2 99%   BMI 25.39 kg/m²     General Appearance: alert and oriented to person, place and time. Appears malnourished. Skin: warm and dry and no rash or erythema  Head: normocephalic and atraumatic  Eyes: pupils equal, round, and reactive to light, extraocular eye movements intact, conjunctival pallor, sclera anicteric.   ENT: oropharynx clear and moist with normal mucous membranes  Neck: neck supple and non tender without mass, no thyromegaly, no thyroid nodules and no cervical adenopathy   Pulmonary/Chest:

## 2019-07-12 NOTE — PROGRESS NOTES
Patient was observed for the first 15 minutes of the transfusion and no suspected reaction was observed.

## 2019-07-12 NOTE — ANESTHESIA PRE PROCEDURE
Department of Anesthesiology  Preprocedure Note       Name:  Maria Del Carmen Bang   Age:  71 y.o.  :  1955                                          MRN:  [id number]         Date:  2019      Surgeon: Reinaldo Almaraz):  Christian Dhaliwal MD    Procedure: CYSTOSCOPY WITH BLADDER IRRIGATION (N/A )    Medications prior to admission:   Prior to Admission medications    Medication Sig Start Date End Date Taking? Authorizing Provider   senna-docusate (PERICOLACE) 8.6-50 MG per tablet Take 1 tablet by mouth daily as needed for Constipation    Yes Historical Provider, MD   oxyCODONE (ROXICODONE) 5 MG immediate release tablet Take 5 mg by mouth every 4 hours as needed for Pain. Yes Historical Provider, MD   prochlorperazine (COMPAZINE) 10 MG tablet Take 10 mg by mouth every 6 hours as needed (nausea)   Yes Historical Provider, MD   HYDROmorphone (DILAUDID) 2 MG tablet Take 1 mg by mouth every 2 hours as needed for Pain. Takes 1/2 tab (=1mg) prn   Yes Historical Provider, MD   amoxicillin (AMOXIL) 500 MG capsule Take 1 capsule by mouth 2 times daily 19  Yes Julio Granados MD   acetaminophen (TYLENOL) 500 MG tablet Take 650 mg by mouth every 8 hours as needed for Pain    Historical Provider, MD   warfarin (COUMADIN) 5 MG tablet Take 5-7.5 mg by mouth daily     Historical Provider, MD   Multiple Vitamins-Minerals (THERAPEUTIC MULTIVITAMIN-MINERALS) tablet Take 1 tablet by mouth daily.     Historical Provider, MD       Current medications:    Current Facility-Administered Medications   Medication Dose Route Frequency Provider Last Rate Last Dose    [MAR Hold] iron sucrose (VENOFER) 200 mg in sodium chloride 0.9 % 100 mL IVPB  200 mg Intravenous Q24H BRENDA Farias - CNP   Stopped at 19 1649    [MAR Hold] 0.9 % sodium chloride infusion   Intravenous Continuous Kip Stockton MD 20 mL/hr at 19 0240      [MAR Hold] sodium chloride flush 0.9 % injection 10 mL  10 mL Intravenous 2 times per day

## 2019-07-12 NOTE — OP NOTE
ureteral orifices were effluxing clear urine. Anterior and lateral bladder walls were examined no bladder mass identified. We then used the electrocautery and the Bugbee electrode and we cauterized the bleeders in the prostate fossa and inside the bladder until there was no residual bleeding. At the completion we applied local lidocaine 2% gel inside the urethra for control of dysuria. Patient tolerated the procedure well and he was returned to recovery room in stable condition. There was no reason to reinsert the Aguila at this time.     Recommendations we will see the patient for follow-up at the office once he is cleared for discharge by      Electronically signed by Danilo Grijalva MD on 7/12/2019 at 3:00 PM

## 2019-07-12 NOTE — ANESTHESIA POSTPROCEDURE EVALUATION
Department of Anesthesiology  Postprocedure Note    Patient: Maria Del Carmen Bang  MRN: [id number]  YOB: 1955  Date of evaluation: 7/12/2019  Time:  4:20 PM     Procedure Summary     Date:  07/12/19 Room / Location:  [facility] OR    Anesthesia Start:  7935 Anesthesia Stop:  0165    Procedure:  CYSTOSCOPY WITH BLADDER IRRIGATION, CLOT EVACUATION, CAUTERIZATION OF BLEEDING (N/A ) Diagnosis:  (gross hematuria)    Surgeon:  Christian Dhaliwal MD Responsible Provider:  Glenys Armendariz DO    Anesthesia Type:  MAC ASA Status:  3          Anesthesia Type: No value filed. Bridgett Phase I: Bridgett Score: 10    Bridgett Phase II:      Last vitals: Reviewed and per EMR flowsheets.        Anesthesia Post Evaluation    Patient location during evaluation: PACU  Patient participation: complete - patient participated  Level of consciousness: awake and alert  Airway patency: patent  Nausea & Vomiting: no nausea and no vomiting  Complications: no  Cardiovascular status: hemodynamically stable  Respiratory status: acceptable  Hydration status: stable

## 2019-07-12 NOTE — PLAN OF CARE
Problem: Falls - Risk of:  Goal: Will remain free from falls  Description  Will remain free from falls  7/11/2019 2253 by Kody Prater RN  Outcome: Ongoing  7/11/2019 1726 by Shaina Isaacs RN  Outcome: Ongoing  Goal: Absence of physical injury  Description  Absence of physical injury  7/11/2019 2253 by Kody Prater RN  Outcome: Ongoing  7/11/2019 1726 by Shaina Isaacs RN  Outcome: Ongoing     Problem: Pain:  Description  Pain management should include both nonpharmacologic and pharmacologic interventions. Goal: Pain level will decrease  Description  Pain level will decrease  Outcome: Ongoing  Note:   Pain level assessment complete.    Patient educated on pain scale and control interventions  PRN pain medication given per patient request  Patient instructed to call out with new onset of pain or unrelieved pain    Goal: Control of acute pain  Description  Control of acute pain  Outcome: Ongoing  Goal: Control of chronic pain  Description  Control of chronic pain  Outcome: Ongoing     Problem: ABCDS Injury Assessment  Goal: Absence of physical injury  Outcome: Ongoing

## 2019-07-13 PROBLEM — N30.91 HEMORRHAGIC CYSTITIS: Status: ACTIVE | Noted: 2019-01-01

## 2019-07-13 NOTE — PROGRESS NOTES
[facility]                                                                                  Urology Progress Note            Subjective: Follow-up gross hematuria hemorrhagic cystitis prostate cancer    Patient Vitals for the past 24 hrs:   BP Temp Temp src Pulse Resp SpO2   07/13/19 0403 101/66 97.7 °F (36.5 °C) Oral 81 16 96 %   07/13/19 0230 111/62 97.5 °F (36.4 °C) Oral 84 16 95 %   07/12/19 1857 94/74 97.7 °F (36.5 °C) Oral 85 20 95 %   07/12/19 1515 -- 97.9 °F (36.6 °C) -- 82 22 98 %   07/12/19 1500 101/67 -- -- 83 15 98 %   07/12/19 1445 100/68 -- -- 87 25 98 %   07/12/19 1438 102/84 97.7 °F (36.5 °C) Temporal 94 24 99 %   07/12/19 0909 118/76 92.5 °F (33.6 °C) Axillary 75 16 98 %       Intake/Output Summary (Last 24 hours) at 7/13/2019 0522  Last data filed at 7/12/2019 2144  Gross per 24 hour   Intake 200 ml   Output 4275 ml   Net -4075 ml       Recent Labs     07/10/19  1638  07/12/19  0512 07/12/19  1205 07/12/19  1802 07/12/19  2357   WBC 22.1*  --  27.1*  --   --   --    HGB 7.9*   < > 9.8* 10.1* 9.7* 8.8*   HCT 25.8*   < > 29.8* 30.9* 31.1* 26.7*   MCV 89.0  --  84.9  --   --   --      --  148  --   --   --     < > = values in this interval not displayed. Recent Labs     07/10/19  1638   *   K 4.8      CO2 18*   BUN 23   CREATININE 0.96       Recent Labs     07/12/19  1421   COLORU RED*   PHUR 6.5   WBCUA 20 TO 50   RBCUA TOO NUMEROUS TO COUNT   MUCUS NOT REPORTED   TRICHOMONAS NOT REPORTED   YEAST NOT REPORTED   BACTERIA MANY*   SPECGRAV 1.005   LEUKOCYTESUR LARGE*   UROBILINOGEN ELEVATED*   BILIRUBINUR Presumptive positive. Unable to confirm due to unavailability of reagent. *       Additional Lab/culture results:    Physical Exam: Patient alert, oriented, not in acute distress, voiding spontaneously, no bladder distention, dysuria much improved    Interval Imaging Findings:    Impression:    Patient Active Problem List   Diagnosis    TIA

## 2019-07-13 NOTE — PROGRESS NOTES
Coverage for Dr. Hiro Mckeon. Subjective: His breathing is stable. Right chest wall pain has improved. No hematuria. VS: /63   Pulse 80   Temp 97.9 °F (36.6 °C) (Oral)   Resp 18   Ht 5' 7\" (1.702 m)   Wt 162 lb 2 oz (73.5 kg)   SpO2 99%   BMI 25.39 kg/m²     Wt:     I/O: In: 200 [P.O.:100;  I.V.:100]  Out: 450 [Urine:450]      Meds: Scheduled Meds:   warfarin  2 mg Oral Daily    warfarin (COUMADIN) daily dosing (placeholder)   Other RX Placeholder    iron sucrose  200 mg Intravenous Q24H    sodium chloride flush  10 mL Intravenous 2 times per day    cefTRIAXone (ROCEPHIN) IV  1 g Intravenous Q24H    therapeutic multivitamin-minerals  1 tablet Oral Daily     Continuous Infusions:   sodium chloride 20 mL/hr at 07/13/19 0949    sodium chloride 20 mL/hr at 07/11/19 2005     PRN Meds:.sodium chloride flush, prochlorperazine, lidocaine, sennosides-docusate sodium, HYDROmorphone, oxyCODONE     Exam:General Appearance:AOX3, NAD  Skin:warm, dry  Pulmonary/Chest: CTA  Cardiovascular: S1S2, RRR, crisp valve click  Extremities: Trace ankle edema    Labs:   CBC with Differential:    Lab Results   Component Value Date    WBC 27.1 07/12/2019    RBC 3.51 07/12/2019    HGB 9.1 07/13/2019    HCT 28.7 07/13/2019     07/12/2019    MCV 84.9 07/12/2019    MCH 27.9 07/12/2019    MCHC 32.9 07/12/2019    RDW 16.6 07/12/2019    LYMPHOPCT 5 07/12/2019    MONOPCT 9 07/12/2019    BASOPCT 0 07/12/2019    MONOSABS 2.44 07/12/2019    LYMPHSABS 1.36 07/12/2019    EOSABS 0.27 07/12/2019    BASOSABS 0.00 07/12/2019    DIFFTYPE NOT REPORTED 07/12/2019       BMP:    Lab Results   Component Value Date     07/10/2019    K 4.8 07/10/2019     07/10/2019    CO2 18 07/10/2019    BUN 23 07/10/2019    LABALBU 4.0 02/04/2019    CREATININE 0.96 07/10/2019    CALCIUM 8.1 07/10/2019    GFRAA >60 07/10/2019    LABGLOM >60 07/10/2019    GLUCOSE 105 07/10/2019       Warfarin PT/INR:    Lab Results   Component Value Date

## 2019-07-14 PROBLEM — E44.1 MILD MALNUTRITION (HCC): Status: ACTIVE | Noted: 2019-01-01

## 2019-07-14 NOTE — PROGRESS NOTES
Alert, oriented, in no distress. Hemodynamically stable. Lungs clear. Abdomen: soft, non-tender. Positive bowel sounds. Constipated. Ext: neg Bright's. Hb: 10 gms. Receiving Venofer. Check serum Fe in the AM.  Change frequency of pain meds. Social Service consult.

## 2019-07-14 NOTE — PROGRESS NOTES
Coverage for Dr. Yadira Marina. Subjective: His breathing is stable. Feeling better overall.     VS: /60   Pulse 80   Temp 97.5 °F (36.4 °C) (Oral)   Resp 16   Ht 5' 7\" (1.702 m)   Wt 162 lb 8 oz (73.7 kg)   SpO2 100%   BMI 25.45 kg/m²     Wt:     I/O: In: 3246 [P.O.:340; I.V.:1208]  Out: 250 [Urine:250]      Meds: Scheduled Meds:   warfarin  2 mg Oral Daily    warfarin (COUMADIN) daily dosing (placeholder)   Other RX Placeholder    iron sucrose  200 mg Intravenous Q24H    sodium chloride flush  10 mL Intravenous 2 times per day    cefTRIAXone (ROCEPHIN) IV  1 g Intravenous Q24H    therapeutic multivitamin-minerals  1 tablet Oral Daily     Continuous Infusions:   sodium chloride 20 mL/hr at 07/13/19 0949    sodium chloride 20 mL/hr at 07/11/19 2005     PRN Meds:.sodium chloride flush, prochlorperazine, lidocaine, sennosides-docusate sodium, HYDROmorphone, oxyCODONE     Exam:General Appearance:AOX3, NAD  Skin:warm, dry  Pulmonary/Chest: CTA  Cardiovascular: S1S2, RRR, crisp valve click  Extremities: Trace ankle edema    Labs:   CBC with Differential:    Lab Results   Component Value Date    WBC 29.8 07/13/2019    RBC 3.45 07/13/2019    HGB 9.2 07/14/2019    HCT 28.8 07/14/2019    PLT 94 07/13/2019    MCV 88.7 07/13/2019    MCH 27.8 07/13/2019    MCHC 31.4 07/13/2019    RDW 17.4 07/13/2019    LYMPHOPCT 5 07/13/2019    MONOPCT 8 07/13/2019    BASOPCT 0 07/13/2019    MONOSABS 2.38 07/13/2019    LYMPHSABS 1.49 07/13/2019    EOSABS 0.30 07/13/2019    BASOSABS 0.00 07/13/2019    DIFFTYPE NOT REPORTED 07/13/2019       BMP:    Lab Results   Component Value Date     07/13/2019    K 4.3 07/13/2019     07/13/2019    CO2 18 07/13/2019    BUN 34 07/13/2019    LABALBU 4.0 02/04/2019    CREATININE 0.99 07/13/2019    CALCIUM 7.8 07/13/2019    GFRAA >60 07/13/2019    LABGLOM >60 07/13/2019    GLUCOSE 129 07/13/2019       Warfarin PT/INR:    Lab Results   Component Value Date    PROTIME 15.4 07/14/2019 cough

## 2019-07-15 PROBLEM — E46 MALNUTRITION (HCC): Status: ACTIVE | Noted: 2019-01-01

## 2019-07-15 NOTE — PROGRESS NOTES
Strengthening, Balance Training, Functional Mobility Training, Transfer Training, Endurance Training, Gait Training, Home Exercise Program, Safety Education & Training, Patient/Caregiver Education & Training, Equipment Evaluation, Education, & procurement  Safety Devices  Type of devices: Call light within reach, Gait belt, Patient at risk for falls, Left in chair    G-Code       OutComes Score                                                  AM-PAC Score  AM-PAC Inpatient Mobility Raw Score : 18 (07/15/19 0942)  AM-PAC Inpatient T-Scale Score : 43.63 (07/15/19 0942)  Mobility Inpatient CMS 0-100% Score: 46.58 (07/15/19 0942)  Mobility Inpatient CMS G-Code Modifier : CK (07/15/19 0942)          Goals  Short term goals  Time Frame for Short term goals: 12 visits  Short term goal 1: inc bed mobility & transfers to indep; Short term goal 2: Inc gait to amb 250ft or > indep with R/walker; Short term goal 3: Inc strength to [address] standing balance to good to facilitate pt independence for performance of ADL's & functional mobility, & reduce fall risk; Short term goal 4: Pt able to tolerate 30-40 min of activity to include 15-20 reps of ex & functional mobility including 5 minutes of standing to facilitate activity tolerance to [facility];   Short term goal 5: Ed pt on home ex's, safety & energy principles & issue written home program;  Patient Goals   Patient goals : get some strength back       Therapy Time   Individual Concurrent Group Co-treatment   Time In 0842         Time Out 0942         Minutes [address] [facility], PT

## 2019-07-15 NOTE — PLAN OF CARE
Problem: Falls - Risk of:  Goal: Will remain free from falls  Description  Will remain free from falls  7/15/2019 0438 by Lyric Wheeler RN  Outcome: Ongoing  Note:   Room free of clutter  Hourly rounding   Non-skid socks worn  Side rails up x2  Bed low and locked  Call light in reach  Instructed to call out before getting out of bed  Anticipatory needs met  Bed alarm on  Falling star at the door and on wristband       Problem: Pain:  Goal: Pain level will decrease  Description  Pain level will decrease  7/15/2019 0438 by Lyric Wheeler RN  Outcome: Ongoing  Note:   Pain level assessed and rated on a 0-10 scale  Assess characteristics of pain  PRN pain medication given per pt request  Non-pharmacological interventions implemented  Report ineffective pain management to physician  Update pt and family of any changes  Pt instructed to call out with new onset of pain  Continue to monitor       Problem: Risk for Impaired Skin Integrity  Goal: Tissue integrity - skin and mucous membranes  Description  Structural intactness and normal physiological function of skin and  mucous membranes.   7/15/2019 0438 by Lyric Wheeler RN  Outcome: Ongoing  Note:   Skin assessment completed and documented  Tao scale updated  Relieve pressure to bony prominences  Avoid shearing  Assess s/sx of infection   Turns side to side  Turns self  Heels elevated  Structural intactness and normal physiological function of skin and mucous membranes       Problem: Pain:  Goal: Control of acute pain  Description  Control of acute pain  7/15/2019 0438 by Lyric Wheeler RN  Outcome: Ongoing  Note:   Pain level assessed and rated on a 0-10 scale  Assess characteristics of pain  PRN pain medication given per pt request  Non-pharmacological interventions implemented  Report ineffective pain management to physician  Update pt and family of any changes  Pt instructed to call out with new onset of pain  Continue to monitor

## 2019-07-15 NOTE — PROGRESS NOTES
Subjective: Denies CP or dyspnea. C/O leg edema    VS: /65   Pulse 89   Temp 97.9 °F (36.6 °C) (Oral)   Resp 16   Ht 5' 7\" (1.702 m)   Wt 162 lb 8 oz (73.7 kg)   SpO2 99%   BMI 25.45 kg/m²     Wt:     I/O: In: 342 [I.V.:342]  Out: 300 [Urine:300]    Monitor:Not on monitor    Meds: Scheduled Meds:   warfarin  2.5 mg Oral Daily    lubiprostone  8 mcg Oral BID WC    warfarin (COUMADIN) daily dosing (placeholder)   Other RX Placeholder    iron sucrose  200 mg Intravenous Q24H    sodium chloride flush  10 mL Intravenous 2 times per day    cefTRIAXone (ROCEPHIN) IV  1 g Intravenous Q24H    therapeutic multivitamin-minerals  1 tablet Oral Daily     Continuous Infusions:   sodium chloride 20 mL/hr at 07/13/19 0949    sodium chloride 20 mL/hr at 07/11/19 2005     PRN Meds:. HYDROmorphone, oxyCODONE, sodium chloride flush, prochlorperazine, lidocaine, sennosides-docusate sodium     Exam:General Appearance:AOx3, NAD  Skin:warm, dry  Pulmonary/Chest: CTA anteriorly  Cardiovascular: S1S2, RRR, negative JVD  Extremities: Moderate peripheral edema    Labs:   CBC with Differential:    Lab Results   Component Value Date    WBC 29.8 07/13/2019    RBC 3.45 07/13/2019    HGB 9.4 07/15/2019    HCT 29.4 07/15/2019    PLT 94 07/13/2019    MCV 88.7 07/13/2019    MCH 27.8 07/13/2019    MCHC 31.4 07/13/2019    RDW 17.4 07/13/2019    LYMPHOPCT 5 07/13/2019    MONOPCT 8 07/13/2019    BASOPCT 0 07/13/2019    MONOSABS 2.38 07/13/2019    LYMPHSABS 1.49 07/13/2019    EOSABS 0.30 07/13/2019    BASOSABS 0.00 07/13/2019    DIFFTYPE NOT REPORTED 07/13/2019     BMP:    Lab Results   Component Value Date     07/13/2019    K 4.3 07/13/2019     07/13/2019    CO2 18 07/13/2019    BUN 34 07/13/2019    LABALBU 4.0 02/04/2019    CREATININE 0.99 07/13/2019    CALCIUM 7.8 07/13/2019    GFRAA >60 07/13/2019    LABGLOM >60 07/13/2019    GLUCOSE 129 07/13/2019       Warfarin PT/INR:    Lab Results   Component Value Date    PROTIME

## 2019-07-15 NOTE — DISCHARGE INSTR - COC
Therapy:  Current Nutrition Therapy:   - Oral Diet:  General  - Oral Nutrition Supplement:  Standard  twice a day    Routes of Feeding: Oral  Liquids: No Restrictions  Daily Fluid Restriction: no  Last Modified Barium Swallow with Video (Video Swallowing Test): not done    Treatments at the Time of Hospital Discharge:   Respiratory Treatments: see MAR  Oxygen Therapy:  is not on home oxygen therapy. Ventilator:    - No ventilator support    Rehab Therapies: Physical Therapy and Occupational Therapy  Weight Bearing Status/Restrictions: No weight bearing restirctions  Other Medical Equipment (for information only, NOT a DME order):  wheelchair, walker and bath bench  Other Treatments:   1. Skilled nursing assessment,  2.  med teaching and compliance      Patient's personal belongings (please select all that are sent with patient):  Glasses    RN SIGNATURE:  Electronically signed by Bienvenido Sheppard RN on 7/25/19 at 7:27 PM    CASE MANAGEMENT/SOCIAL WORK SECTION    Inpatient Status Date: 7/10    Readmission Risk Assessment Score:  Readmission Risk              Risk of Unplanned Readmission:        18           Discharging to Facility/ Agency   · Name: [facility]  · Address:  · Phone: [phone]  · Fax: [phone]    Dialysis Facility (if applicable)   · Name:  · Address:  · Dialysis Schedule:  · Phone:  · Fax:    / signature: Electronically signed by Curtis Rothman RN on 7/15/19 at 12:34 PM    PHYSICIAN SECTION    Prognosis: poor    Condition at Discharge: Stable    Rehab Potential (if transferring to Rehab): Poor    Recommended Labs or Other Treatments After Discharge: see med list. Palliative Care ([address]) to manage patient's pain/anxiety including scripts. [facility]. See med list. Patient to follow also with Oncology (Dr. Michael abernathy), Urology (Dr. Yvette Borges), Cardiology (Dr. Sandra Macias). Dr. Sandra Macias is following his Anticoagulation (Coumadin).  Will follow-up

## 2019-07-16 PROBLEM — E88.09 EDEMA DUE TO HYPOALBUMINEMIA: Status: ACTIVE | Noted: 2019-01-01

## 2019-07-16 NOTE — PLAN OF CARE
Problem: Falls - Risk of:  Goal: Will remain free from falls  Description  Will remain free from falls  7/16/2019 1319 by Cecilia Blum RN  Outcome: Ongoing  7/16/2019 0242 by Maria Luisa Abbasi RN  Outcome: Ongoing  Goal: Absence of physical injury  Description  Absence of physical injury  Outcome: Ongoing     Problem: Pain:  Goal: Pain level will decrease  Description  Pain level will decrease  Outcome: Ongoing  Goal: Control of acute pain  Description  Control of acute pain  7/16/2019 1319 by Cecilia Blum RN  Outcome: Ongoing  7/16/2019 0242 by Maria Luisa Abbasi RN  Outcome: Ongoing  Goal: Control of chronic pain  Description  Control of chronic pain  Outcome: Ongoing     Problem: ABCDS Injury Assessment  Goal: Absence of physical injury  7/16/2019 0242 by Maria Luisa Abbasi RN  Outcome: Ongoing     Problem: Risk for Impaired Skin Integrity  Goal: Tissue integrity - skin and mucous membranes  Description  Structural intactness and normal physiological function of skin and  mucous membranes. 7/16/2019 0242 by Maria Luisa Abbasi RN  Outcome: Ongoing     Problem:  Activity Intolerance:  Goal: Ability to tolerate increased activity will improve  Description  Ability to tolerate increased activity will improve  7/16/2019 0242 by Maria Luisa Abbasi RN  Outcome: Ongoing     Problem: Nutrition Deficit:  Goal: Ability to achieve adequate nutritional intake will improve  Description  Ability to achieve adequate nutritional intake will improve  7/16/2019 0242 by Maria Luisa Abbasi RN  Outcome: Ongoing     Problem: Pain:  Goal: Control of acute pain  Description  Control of acute pain  7/16/2019 0242 by Maria Luisa Abbasi RN  Outcome: Ongoing     Problem: Nutrition  Goal: Optimal nutrition therapy  Description  Nutrition Problem: Inadequate oral intake  Intervention: Food and/or Nutrient Delivery: Continue current diet, Start ONS  Nutritional Goals: PO intake to meet >75% of estimated kcal/protein needs, with good GI tolerance   7/16/2019 1319 by Phani Weinberg RN  Outcome: Ongoing  7/16/2019 0242 by Lisy Davalos RN  Outcome: Ongoing     Problem: Musculor/Skeletal Functional Status  Goal: Absence of falls  7/16/2019 0242 by Lisy Davalos RN  Outcome: Ongoing

## 2019-07-16 NOTE — PROGRESS NOTES
Fall Risk  Required Braces or Orthoses?: No  Position Activity Restriction  Other position/activity restrictions: up as tolerated, LUE IV  Subjective   General  Chart Reviewed:  Yes  Additional Pertinent Hx: pancreatic cancer, CAD, endocarditis  Response To Previous Treatment: Not applicable  Subjective  Subjective: Pt agreeable to PT  General Comment  Comments: RNNatalie PT  Pain Screening  Patient Currently in Pain: Yes  Vital Signs  Patient Currently in Pain: Yes       Orientation  Orientation  Overall Orientation Status: Within Functional Limits  Cognition      Objective   Bed mobility  Rolling to Left: Supervision  Rolling to Right: Supervision  Supine to Sit: Stand by assistance  Sit to Supine: Stand by assistance  Scooting: Contact guard assistance  Comment: cues/assist on technique & line management  Transfers  Sit to Stand: Minimal Assistance  Stand to sit: Stand by assistance  Bed to Chair: Stand by assistance  Lateral Transfers: Contact guard assistance  Comment: limited eccentric control to sit & needs UB to control descent  Ambulation  Ambulation?: Yes  More Ambulation?: Yes  Ambulation 1  Surface: level tile  Device: Rolling Walker  Assistance: Minimal assistance  Quality of Gait: step through pattern  Gait Deviations: Decreased step length;Decreased step height  Distance: 30ftx3  Ambulation 2  Surface - 2: level tile  Device 2: Rolling Walker  Assistance 2: Minimal assistance  Quality of Gait 2: step to & shuffle pattern  Gait Deviations: Decreased step height;Decreased step length;Shuffles  Distance: 20ft  Ambulation 3  Surface - 3: level tile  Device 3: Rolling Walker  Assistance 3: Contact guard assistance  Gait Deviations: Shuffles;Decreased step length;Decreased step height  Distance: [id number]     Balance  Sitting - Static: Good  Sitting - Dynamic: Good  Standing - Static: Good  Standing - Dynamic: Fair;+  Exercises  Comments: functional mobility, safety & LE ex's for LAQ, Hip Abd/Add, Heel/Toe-raises, HS curls, Hip flexion  Reps:10     Pt at EOB up in chair at end of visit. All lines intact, call light within reach, and patient positioned comfortably at end of treatment. All patient needs addressed prior to ending therapy session. G-Code     OutComes Score                                                     AM-PAC Score  AM-PAC Inpatient Mobility Raw Score : 19 (07/16/19 1430)  AM-PAC Inpatient T-Scale Score : 45.44 (07/16/19 1430)  Mobility Inpatient CMS 0-100% Score: 41.77 (07/16/19 1430)  Mobility Inpatient CMS G-Code Modifier : CK (07/16/19 1430)          Goals  Short term goals  Time Frame for Short term goals: 12 visits  Short term goal 1: inc bed mobility & transfers to indep; Short term goal 2: Inc gait to amb 250ft or > indep with R/walker; Short term goal 3: Inc strength to [address] standing balance to good to facilitate pt independence for performance of ADL's & functional mobility, & reduce fall risk; Short term goal 4: Pt able to tolerate 30-40 min of activity to include 15-20 reps of ex & functional mobility including 5 minutes of standing to facilitate activity tolerance to [facility];   Short term goal 5: Ed pt on home ex's, safety & energy principles & issue written home program;  Patient Goals   Patient goals : get some strength back    Plan    Plan  Times per week: 1-2x/D,5-6d/week  Current Treatment Recommendations: Strengthening, Balance Training, Functional Mobility Training, Transfer Training, Endurance Training, Gait Training, Home Exercise Program, Safety Education & Training, Patient/Caregiver Education & Training, Equipment Evaluation, Education, & procurement  Safety Devices  Type of devices: Call light within reach, Gait belt, Patient at risk for falls, Left in chair     Therapy Time   Individual Concurrent Group Co-treatment   Time In 1402         Time Out 1431         Minutes [address], PT

## 2019-07-16 NOTE — PROGRESS NOTES
Dr Iliana Campuzano informed of pt low blood pressure and c/o dizziness and tiredness. No further orders at this time.

## 2019-07-17 NOTE — PROGRESS NOTES
ex & functional mobility including 5 minutes of standing to facilitate activity tolerance to [facility];   Short term goal 5: Ed pt on home ex's, safety & energy principles & issue written home program;  Patient Goals   Patient goals : get some strength back    Plan    Plan  Times per week: 1-2x/D,5-6d/week  Current Treatment Recommendations: Strengthening, Balance Training, Functional Mobility Training, Transfer Training, Endurance Training, Gait Training, Home Exercise Program, Safety Education & Training, Patient/Caregiver Education & Training, Equipment Evaluation, Education, & procurement  Safety Devices  Type of devices: Nurse notified, Left in bed, Gait belt, Call light within reach, Bed alarm in place, All fall risk precautions in place     Therapy Time   Individual Concurrent Group Co-treatment   Time In  1502         Time Out  1542         Minutes  [address]

## 2019-07-17 NOTE — PLAN OF CARE
Problem: Falls - Risk of:  Goal: Will remain free from falls  Description  Will remain free from falls  7/17/2019 0104 by Jamie Jamil RN  Outcome: Ongoing     Problem: Pain:  Goal: Control of acute pain  Description  Control of acute pain  7/17/2019 0104 by Jamie Jamil RN  Outcome: Ongoing     Problem: ABCDS Injury Assessment  Goal: Absence of physical injury  Outcome: Ongoing

## 2019-07-17 NOTE — PROGRESS NOTES
Safety Education & Training, Patient/Caregiver Education & Training, Self-Care / ADL    AM-PAC Score        AM-PAC Inpatient Daily Activity Raw Score: 19 (07/17/19 1106)  AM-PAC Inpatient ADL T-Scale Score : 40.22 (07/17/19 1106)  ADL Inpatient CMS 0-100% Score: 42.8 (07/17/19 1106)  ADL Inpatient CMS G-Code Modifier : CK (07/17/19 1106)    Goals  Short term goals  Time Frame for Short term goals: For duration of hospital stay, pt will:  Short term goal 1: independently complete grooming tasks. Short term goal 2: independently complete UB bathe and dress task. Short term goal 3: complete LB bathe and dress with Ruthann. Short term goal 4: complete toileting task with Ruthann. Short term goal 5: complete functional mobility for ADL with Ruthann.   Patient Goals   Patient goals : return home        Therapy Time   Individual Concurrent Group Co-treatment   Time In 1000         Time Out 1023         Minutes [address]UA[address] Jara

## 2019-07-18 NOTE — PLAN OF CARE
planning  Description  Participates in care planning  7/18/2019 0527 by Sanjuanita Escobedo RN  Outcome: Ongoing    Goal: Discharged to appropriate level of care  Description  Discharged to appropriate level of care  7/18/2019 0527 by Sanjuanita Escobedo RN  Outcome: Ongoing       Problem: Activity Intolerance:  Goal: Ability to tolerate increased activity will improve  Description  Ability to tolerate increased activity will improve  7/18/2019 0527 by Sanjuanita Escobedo RN  Outcome: Ongoing       Problem:  Bowel Function - Altered:  Goal: Bowel elimination is within specified parameters  Description  Bowel elimination is within specified parameters  7/18/2019 0527 by Sanjuanita Escobedo RN  Outcome: Ongoing       Problem: Nutrition Deficit:  Goal: Ability to achieve adequate nutritional intake will improve  Description  Ability to achieve adequate nutritional intake will improve  7/18/2019 0527 by Sanjuanita Escobedo RN  Outcome: Ongoing       Problem: Pain:  Goal: Ability to notify healthcare provider of pain before it becomes unmanageable or unbearable will improve  Description  Ability to notify healthcare provider of pain before it becomes unmanageable or unbearable will improve  7/18/2019 0527 by Sanjuanita Escobedo RN  Outcome: Ongoing    Goal: Control of acute pain  Description  Control of acute pain  7/18/2019 0527 by Sanjuanita Escobedo RN  Outcome: Ongoing    Goal: Control of chronic pain  Description  Control of chronic pain  7/18/2019 0527 by Sanjuanita Escobedo RN  Outcome: Ongoing       Problem: Nutrition  Goal: Optimal nutrition therapy  Description  Nutrition Problem: Inadequate oral intake  Intervention: Food and/or Nutrient Delivery: Continue current diet, Start ONS  Nutritional Goals: PO intake to meet >75% of estimated kcal/protein needs, with good GI tolerance   7/18/2019 0527 by Sanjuanita Escobedo RN  Outcome: Ongoing       Problem: Musculor/Skeletal Functional Status  Goal: Absence of falls  7/18/2019 0527 by Sanjuanita Escobedo

## 2019-07-19 NOTE — ANESTHESIA PRE PROCEDURE
07/12/19 101/71   04/25/19 112/71       NPO Status:                                                                                 BMI:   Wt Readings from Last 3 Encounters:   07/19/19 166 lb 9.6 oz (75.6 kg)   04/25/19 160 lb (72.6 kg)   01/17/19 168 lb 12.8 oz (76.6 kg)     Body mass index is 26.09 kg/m². CBC:   Lab Results   Component Value Date    WBC 29.8 07/13/2019    RBC 3.45 07/13/2019    HGB 7.6 07/19/2019    HCT 23.9 07/19/2019    MCV 88.7 07/13/2019    RDW 17.4 07/13/2019    PLT 94 07/13/2019       CMP:   Lab Results   Component Value Date     07/18/2019    K 4.1 07/18/2019    CL 99 07/18/2019    CO2 20 07/18/2019    BUN 34 07/18/2019    CREATININE 0.99 07/18/2019    GFRAA >60 07/18/2019    LABGLOM >60 07/18/2019    GLUCOSE 93 07/18/2019    PROT 5.8 07/18/2019    CALCIUM 8.0 07/18/2019    BILITOT 1.31 07/18/2019    ALKPHOS 282 07/18/2019     07/18/2019    ALT 44 07/18/2019       POC Tests: No results for input(s): POCGLU, POCNA, POCK, POCCL, POCBUN, POCHEMO, POCHCT in the last 72 hours.     Coags:   Lab Results   Component Value Date    PROTIME 23.9 07/19/2019    PROTIME 37.7 11/11/2016    INR 2.4 07/19/2019    APTT 34.3 07/18/2019       HCG (If Applicable): No results found for: PREGTESTUR, PREGSERUM, HCG, HCGQUANT     ABGs: No results found for: PHART, PO2ART, NTW2REZ, JIP8CDL, BEART, E8RAOQDO     Type & Screen (If Applicable):  No results found for: LABABO, LABRH    Anesthesia Evaluation   no history of anesthetic complications:   Airway: Mallampati: II  TM distance: >3 FB   Neck ROM: full  Mouth opening: > = 3 FB Dental:          Pulmonary: breath sounds clear to auscultation                             Cardiovascular:    (+) valvular problems/murmurs (s/p mechanical AVR):, CAD:, GOMEZ:,         Rhythm: regular  Rate: normal                    Neuro/Psych:   (+) CVA:, TIA,             GI/Hepatic/Renal:   (+) GERD: well controlled,           Endo/Other:    (+) blood dyscrasia:

## 2019-07-19 NOTE — PROGRESS NOTES
Alert, in no distress. NSR, no ectopics. Lungs clear. Abdomen: distended but soft. Edema less than baseline. Further drop in Hb this morning. A unit of blood was ordered transfused. Current labs: Low fibrinogen level, elevated D dimer, elevated PTT, low grade thrombocytopenia (94 on 13 July from 148) - concern for ? DIC. Hematology consulted. Discussed with Dr. Florentino Barrientos including cysto findings. Discussed with patient/wife at bedside.

## 2019-07-19 NOTE — PLAN OF CARE
Problem: Falls - Risk of:  Goal: Will remain free from falls  Description  Will remain free from falls  7/19/2019 0344 by Марина Barrios RN  Outcome: Ongoing  7/18/2019 2051 by Markel Homans, RN  Outcome: Ongoing  Goal: Absence of physical injury  Description  Absence of physical injury  7/19/2019 0344 by Марина Barrios RN  Outcome: Ongoing  7/18/2019 2051 by Markel Homans, RN  Outcome: Ongoing     Problem: Pain:  Goal: Pain level will decrease  Description  Pain level will decrease  7/19/2019 0344 by Марина Barrios RN  Outcome: Ongoing  7/18/2019 2051 by Markel Homans, RN  Outcome: Ongoing  Goal: Control of acute pain  Description  Control of acute pain  7/19/2019 0344 by Марина Barrios RN  Outcome: Ongoing  7/18/2019 2051 by Markel Homans, RN  Outcome: Ongoing  Goal: Control of chronic pain  Description  Control of chronic pain  7/19/2019 0344 by Марина Barrios RN  Outcome: Ongoing  7/18/2019 2051 by Markel Homans, RN  Outcome: Ongoing     Problem: ABCDS Injury Assessment  Goal: Absence of physical injury  7/19/2019 0344 by Марина Barrios RN  Outcome: Ongoing  7/18/2019 2051 by Markel Homans, RN  Outcome: Met This Shift     Problem: Urinary Elimination:  Goal: Signs and symptoms of infection will decrease  Description  Signs and symptoms of infection will decrease  7/19/2019 0344 by Марина Barrios RN  Outcome: Ongoing  7/18/2019 2051 by Markel Homans, RN  Outcome: Ongoing  Goal: Ability to reestablish a normal urinary elimination pattern will improve - after catheter removal  Description  Ability to reestablish a normal urinary elimination pattern will improve  7/19/2019 0344 by Марина Barrios RN  Outcome: Ongoing  7/18/2019 2051 by Markel Homans, RN  Outcome: Not Met This Shift  Goal: Complications related to the disease process, condition or treatment will be avoided or minimized  Description  Complications related to the disease process, condition or treatment will be avoided Problem: Pain:  Goal: Ability to notify healthcare provider of pain before it becomes unmanageable or unbearable will improve  Description  Ability to notify healthcare provider of pain before it becomes unmanageable or unbearable will improve  7/19/2019 0344 by Sanjuanita Escobedo RN  Outcome: Ongoing  7/18/2019 2051 by Lance Painting RN  Outcome: Not Met This Shift  Goal: Control of acute pain  Description  Control of acute pain  7/19/2019 0344 by Sanjuanita Escobedo RN  Outcome: Ongoing  7/18/2019 2051 by Lance Painting RN  Outcome: Not Met This Shift  Goal: Control of chronic pain  Description  Control of chronic pain  7/19/2019 0344 by Sanjuanita Escobedo RN  Outcome: Ongoing  7/18/2019 2051 by Lance Painting RN  Outcome: Not Met This Shift     Problem: Nutrition  Goal: Optimal nutrition therapy  Description  Nutrition Problem: Inadequate oral intake  Intervention: Food and/or Nutrient Delivery: Continue current diet, Start ONS  Nutritional Goals: PO intake to meet >75% of estimated kcal/protein needs, with good GI tolerance   Outcome: Ongoing     Problem: Musculor/Skeletal Functional Status  Goal: Absence of falls  Outcome: Ongoing     Problem: IP BALANCE  Goal: LTG - Patient will maintain balance to allow for safe/functional mobility  Outcome: Ongoing     Problem: IP BATHING  Goal: STG - patient will bathe body  Outcome: Ongoing     Problem: IP DRESSINGS LOWER EXTREMITIES  Goal: LTG - patient will dress lower body with or without assistive device  Outcome: Ongoing     Problem: IP DRESSING UPPER EXTREMITIES  Goal: STG - patient will dress upper body with or without assistive device  Outcome: Ongoing     Problem: IP GROOMING  Goal: LTG - patient will complete daily grooming tasks  Outcome: Ongoing     Problem: SAFETY  Goal: LTG - patient will utilize safety techniques  Outcome: Ongoing     Problem: IP TOILETING  Goal: LTG - patient will complete daily toileting tasks  Outcome: Ongoing     Problem: IP

## 2019-07-19 NOTE — PROGRESS NOTES
Spoke with Dr Michael Mota at this time. Was called in to room to assist the PCT. Pt was on commode and became dizzy and nauseated sitting there. PCT noticed the oxygen sat fluctuating between %. Returned pt to bed and oxygen at 2 L per NC. Pt nurse informed.  informed and orders received.

## 2019-07-19 NOTE — CARE COORDINATION
Discharge planning    Patient transferred downstairs from [facility] surgery. Patient had episode of dizziness and nausea while he was on the commode. Per reports oxygen had dropped into the 70's. Denies chest pain or dyspnea    CARD mechanical AVR on coumadin, hematuria and urology following, planning cysto. Dizziness 2/2 hypotension and vasovagal reflex, PRBC given.      PT rec home with assist    CM referral to [facility]     Discharging to Facility/ Agency   · Name: [facility]  · Address:  · Phone: [phone]  · Fax: [phone]

## 2019-07-19 NOTE — PROGRESS NOTES
GLUCOSE 93 07/18/2019       Warfarin PT/INR:    Lab Results   Component Value Date    PROTIME 23.9 07/19/2019    PROTIME 37.7 11/11/2016    INR 2.4 07/19/2019         A/P:1. Mechanical AVR  -INR therapeutic. Will decrease Coumadin to 2mg QD      2. MR/TR  -Patient is being treated with medical therapy. Diuretics as per primary service     3. Hematuria  -Urology following. Plans for cystoscopy this afternoon     4. Anemia  -Hgb trending down. Receiving PRBC. 5. Dizziness  -Secondary to hypotension and vasovagal reflex. Receiving PRBC.  Will review     Will discuss with Dr. Brea Ortega    Electronically signed by BRENDA Marrero CNP on 7/19/2019 at 11:47 AM

## 2019-07-19 NOTE — PROGRESS NOTES
Nutrition Assessment    Type and Reason for Visit: Initial, Consult(Poor appetite & po intake)    Nutrition Recommendations: 1. Suggest when appropriate resuming general diet. 2. Consider once eating again, maintaining on Ensure Enlive (vanilla) ONS, x 2/day. Monitor need to substitute for another as requested by Pt. Nutrition Assessment: Pt/bed gone to cystoscopy. NPO status for procedure, previously on a general diet. PO intake:  variable. +BM reported on 7/17. Suggest resuming general diet post-procedure. Monitor need to change to different ONS per Pt preferences. Malnutrition Assessment:  · Malnutrition Status: Mild Malnutrition  · Context: Chronic illness  · Findings of the 6 clinical characteristics of malnutrition (Minimum of 2 out of 6 clinical characteristics is required to make the diagnosis of moderate or severe Protein Calorie Malnutrition based on AND/ASPEN Guidelines):  1. Energy Intake-Less than or equal to 50% of estimated energy requirement, (x 4 days)    2. Weight Loss-No significant weight loss, in 3 months  3. Fat Loss-Mild subcutaneous fat loss, Orbital, Triceps  4. Muscle Loss-Mild muscle mass loss, Temples (temporalis muscle), Clavicles (pectoralis and deltoids)  5. Fluid Accumulation-Mild fluid accumulation, Extremities  6.   Strength-Not measured    Nutrition Risk Level: High    Nutrient Needs:  · Estimated Daily Total Kcal: 2,350-2,500 kcal (33-35 kcal/kg)  · Estimated Daily Protein (g):  g (1.3-1.45 g/kg)  · Estimated Daily Total Fluid (ml/day): 1,900-2,000 ml (26-28 ml/kg)    Nutrition Diagnosis:   · Problem: Inadequate oral intake  · Etiology: related to Catabolic illness    Signs and symptoms:  as evidenced by Intake Diet history of poor intake, Mild loss of subcutaneous fat, Mild muscle loss, Localized or generalized fluid accumulation    Objective Information:  · Nutrition-Focused Physical Findings: GI:  soft, rounded, last BM (7/17), nontender, active

## 2019-07-19 NOTE — PROGRESS NOTES
Patient transferred back from PACU post-cysto   Vitals stable  Re-oriented to room  Assessment completed

## 2019-07-19 NOTE — PROGRESS NOTES
Physical Therapy  DATE: 2019    NAME: Florina James  MRN: [id number]   : 1955    Patient not seen this date for Physical Therapy due to:  [] Blood transfusion in progress  [x] Cancel by RN  Pt leaving for a cysto, low hemoglobin, presently not appropriate for PT  [] Hemodialysis  []  Refusal by Patient   [] Spine Precautions   [] Strict Bedrest  [] Surgery  [] Testing      [] Other        [] PT being discontinued at this time. Patient independent. No further needs. [] PT being discontinued at this time as the patient has been transferred to hospice care. No further needs.     Esthela Acosta, PTA

## 2019-07-20 NOTE — PROGRESS NOTES
Risk  Required Braces or Orthoses?: No  Position Activity Restriction  Other position/activity restrictions: Up with assist  Subjective   General  Chart Reviewed: Yes  Additional Pertinent Hx: pancreatic cancer, CAD, endocarditis  Subjective  Subjective: Pt agreeable to PT  General Comment  Comments: RNLionel PT          Orientation  Orientation  Overall Orientation Status: Within Functional Limits  Cognition      Objective      Transfers  Sit to Stand: Contact guard assistance  Stand to sit: Contact guard assistance  Bed to Chair: Contact guard assistance  Lateral Transfers: Contact guard assistance  Ambulation  Ambulation?: Yes  More Ambulation?: Yes  Ambulation 1  Surface: level tile  Device: Single point cane  Assistance: Contact guard assistance  Quality of Gait: slow, step to pattern, no LOB  Gait Deviations: Slow Rachele;Decreased step length  Distance: 40ft  Ambulation 2  Surface - 2: level tile  Device 2: Single point cane  Assistance 2: Contact guard assistance  Quality of Gait 2: step to & shuffle pattern  Gait Deviations: Decreased step height;Decreased step length; Slow Rachele  Distance: 40ft  Stairs/Curb  Stairs?: No     Balance  Sitting - Static: Good  Sitting - Dynamic: Good  Standing - Static: Good;-  Standing - Dynamic: Fair;+  Exercises  Comments: Seated LE exercises: Ankle pumps, LAQ  Standing LE exercises: Heel raises, marching, hip ABD  Written HEP issued with pt report of good understanding. Will review again next session       AM-PAC Score     AM-PAC Inpatient Mobility without Stair Climbing Raw Score : 15 (07/20/19 1244)  AM-PAC Inpatient without Stair Climbing T-Scale Score : 43.03 (07/20/19 1244)  Mobility Inpatient CMS 0-100% Score: 47.43 (07/20/19 1244)  Mobility Inpatient without Stair CMS G-Code Modifier : CK (07/20/19 1244)       Goals  Short term goals  Time Frame for Short term goals: 12 visits  Short term goal 1: inc bed mobility & transfers to indep; Short term goal 2:  Inc

## 2019-07-20 NOTE — PLAN OF CARE
Problem: Falls - Risk of:  Goal: Will remain free from falls  Description  Will remain free from falls. Fall risk assessment completed. Patient instructed to use call light. Bed locked and in lowest position, side rails up 2/4, call light and bedside table within reach, clutter removed, and non-skid footwear on when pt out of bed. Hourly rounds will continue. 7/20/2019 1713 by Jason Cordova RN  Outcome: Ongoing     Problem: Pain:  Goal: Pain level will decrease  Description  Pain level will decrease. Pain assessment completed. Patient demonstrates understanding of pain rating scale and interventions. At this time patient states current interventions are helping to control acute and chronic pain.      Will continue to monitor and reassess with each rounding and PRN.     7/20/2019 1713 by Jason Cordova RN  Outcome: Ongoing

## 2019-07-20 NOTE — PROGRESS NOTES
Alert, oriented, in no distress. Hemodynamically stable. Lungs clear. Abdomen: mild distention, non-tender. Positive bowel sounds. Ext: edema persists but improved from baseline. Neg Bright's. Urine clear today. Will send sample for U/A. Hematology/Oncolgy note/assessment/recommendations appreciated. Discussed with patient/wife at bedside.

## 2019-07-20 NOTE — CONSULTS
CAD (coronary artery disease), Endocarditis, Enterococcal sepsis ([facility] [address] Pancreatic carcinoma ([facility] [address] Positive cardiac stress test, Prostate cancer [facility]), Prosthetic valve endocarditis, subsequent encounter, and Unspecified cerebral artery occlusion with cerebral infarction. Past Surgical History:   has a past surgical history that includes Tonsillectomy; other surgical history; Colonoscopy (4/5/2015); Cardiac valve replacement (0456,2021); hernia repair (4/2015); Cystoscopy (8/18/15); Cystoscopy (12/02/2016); Aortic valve replacement (1989); Cystocopy (07/12/2019); Cystoscopy (N/A, 7/12/2019); and Cystocopy (07/19/2019). Medications:    Prior to Admission medications    Medication Sig Start Date End Date Taking? Authorizing Provider   senna-docusate (PERICOLACE) 8.6-50 MG per tablet Take 1 tablet by mouth daily as needed for Constipation    Yes Historical Provider, MD   oxyCODONE (ROXICODONE) 5 MG immediate release tablet Take 5 mg by mouth every 4 hours as needed for Pain. Yes Historical Provider, MD   prochlorperazine (COMPAZINE) 10 MG tablet Take 10 mg by mouth every 6 hours as needed (nausea)   Yes Historical Provider, MD   HYDROmorphone (DILAUDID) 2 MG tablet Take 1 mg by mouth every 2 hours as needed for Pain. Takes 1/2 tab (=1mg) prn   Yes Historical Provider, MD   amoxicillin (AMOXIL) 500 MG capsule Take 1 capsule by mouth 2 times daily 4/25/19  Yes Lalitha Philip MD   acetaminophen (TYLENOL) 500 MG tablet Take 650 mg by mouth every 8 hours as needed for Pain    Historical Provider, MD   warfarin (COUMADIN) 5 MG tablet Take 5-7.5 mg by mouth daily     Historical Provider, MD   Multiple Vitamins-Minerals (THERAPEUTIC MULTIVITAMIN-MINERALS) tablet Take 1 tablet by mouth daily.     Historical Provider, MD     Current Facility-Administered Medications   Medication Dose Route Frequency Provider Last Rate Last Dose    0.9 % sodium chloride bolus  250 mL Intravenous Once Hollie Tolliver MD       [facility] cyanosis   Skin - normal coloration and turgor, no rashes, no suspicious skin lesions noted ,      DATA:      Labs:     CBC:   Recent Labs     07/19/19  1740 07/20/19  0024 07/20/19  0555   HGB 8.0* 7.9* 8.3*   HCT 25.6* 25.6* 26.5*   PLT 66*  --   --      BMP:   Recent Labs     07/18/19  0547   *   K 4.1   CO2 20   BUN 34*   CREATININE 0.99   LABGLOM >60   GLUCOSE 93     PT/INR:   Recent Labs     07/19/19  0641 07/20/19  0555   PROTIME 23.9* 21.4*   INR 2.4 2.1     APTT:  Recent Labs     07/18/19  1728   APTT 34.3*     LIVER PROFILE:  Recent Labs     07/18/19  1728   *   ALT 44*   LABALBU 3.3*     Results for orders placed or performed during the hospital encounter of 07/10/19   Urine Culture   Result Value Ref Range    Specimen Description . CLEAN CATCH URINE     Special Requests NOT REPORTED     Culture NO GROWTH    Cult,Blood   Result Value Ref Range    Specimen Description . BLOOD     Special Requests RH 2ML     Culture NO GROWTH 6 DAYS    Cult,Blood   Result Value Ref Range    Specimen Description . BLOOD     Special Requests RH 5ML     Culture NO GROWTH 6 DAYS    Urine culture via catheter   Result Value Ref Range    Specimen Description . CATHETERIZED URINE     Special Requests NOT REPORTED     Culture NO GROWTH    Cult,Urine,CC   Result Value Ref Range    Specimen Description . URINE     Special Requests NOT REPORTED     Culture NO GROWTH    CULTURE BLOOD #1   Result Value Ref Range    Specimen Description . BLOOD     Special Requests NOT REPORTED     Culture NO GROWTH 6 DAYS    Cult,Urine,CC   Result Value Ref Range    Specimen Description . URINE     Special Requests NOT REPORTED     Culture NO GROWTH    CBC Auto Differential   Result Value Ref Range    WBC 22.1 (H) 3.5 - 11.3 k/uL    RBC 2.90 (L) 4.21 - 5.77 m/uL    Hemoglobin 7.9 (L) 13.0 - 17.0 g/dL    Hematocrit 25.8 (L) 40.7 - 50.3 %    MCV 89.0 82.6 - 102.9 fL    MCH 27.2 25.2 - 33.5 pg    MCHC 30.6 28.4 - 34.8 g/dL    RDW 17.0 (H) 11.8 - 14.4 performed.       Hgb/Hct   Result Value Ref Range    Hemoglobin 9.3 (L) 13.0 - 17.0 g/dL    Hematocrit 28.9 (L) 40.7 - 50.3 %   Hgb/Hct   Result Value Ref Range    Hemoglobin 8.9 (L) 13.0 - 17.0 g/dL    Hematocrit 28.3 (L) 40.7 - 50.3 %   PROTIME-INR   Result Value Ref Range    Protime 16.5 (H) 9.7 - 11.6 sec    INR 1.6    Basic Metabolic Prof   Result Value Ref Range    Glucose 90 70 - 99 mg/dL    BUN 35 (H) 8 - 23 mg/dL    CREATININE 0.99 0.70 - 1.20 mg/dL    Bun/Cre Ratio 35 (H) 9 - 20    Calcium 7.6 (L) 8.6 - 10.4 mg/dL    Sodium 132 (L) 135 - 144 mmol/L    Potassium 4.3 3.7 - 5.3 mmol/L    Chloride 99 98 - 107 mmol/L    CO2 20 20 - 31 mmol/L    Anion Gap 13 9 - 17 mmol/L    GFR Non-African American >60 >60 mL/min    GFR African American >60 >60 mL/min    GFR Comment          GFR Staging NOT REPORTED    Urinalysis, Routine   Result Value Ref Range    Color, UA YELLOW YELLOW    Turbidity UA CLOUDY (A) CLEAR    Glucose, Ur NEGATIVE NEGATIVE    Bilirubin Urine NEGATIVE NEGATIVE    Ketones, Urine NEGATIVE NEGATIVE    Specific Gravity, UA 1.015 1.005 - 1.030    Urine Hgb 3+ (A) NEGATIVE    pH, UA 5.5 5.0 - 8.0    Protein, UA 1+ (A) NEGATIVE    Urobilinogen, Urine Normal Normal    Nitrite, Urine NEGATIVE NEGATIVE    Leukocyte Esterase, Urine TRACE (A) NEGATIVE    Urinalysis Comments NOT REPORTED    Liver Profile   Result Value Ref Range    Alb 2.9 (L) 3.5 - 5.2 g/dL    Alkaline Phosphatase 286 (H) 40 - 129 U/L    ALT 46 (H) 5 - 41 U/L     (H) <40 U/L    Total Bilirubin 1.09 0.3 - 1.2 mg/dL    Bilirubin, Direct 0.61 (H) <0.31 mg/dL    Bilirubin, Indirect 0.48 0.00 - 1.00 mg/dL    Total Protein 5.9 (L) 6.4 - 8.3 g/dL    Globulin NOT REPORTED 1.5 - 3.8 g/dL    Albumin/Globulin Ratio NOT REPORTED 1.0 - 2.5   Microscopic Urinalysis   Result Value Ref Range    -          WBC, UA 20 TO 50 0 - 5 /HPF    RBC, UA TOO NUMEROUS TO COUNT 0 - 2 /HPF    Casts UA NOT REPORTED /LPF    Crystals UA NOT REPORTED None /HPF Nurse.      Thank you for asking us to see this patient. Dc De La Cruz MD          This note is created with the assistance of a speech recognition program.  While intending to generate a document that actually reflects the content of the visit, the document can still have some errors including those of syntax and sound a like substitutions which may escape proof reading. It such instances, actual meaning can be extrapolated by contextual diversion.

## 2019-07-21 PROBLEM — E43 SEVERE MALNUTRITION (HCC): Status: ACTIVE | Noted: 2019-01-01

## 2019-07-21 NOTE — PROGRESS NOTES
Pt is feeling week  Complains of abdominal pain and nausea  Borderline hypotensive  No JVD  Lungs are clear  CV RRR  Moderate peripheral edema    Plan to decrease Bumex noted  Will increase midodrine  Protonix.

## 2019-07-22 PROBLEM — E72.20 HYPERAMMONEMIA (HCC): Status: ACTIVE | Noted: 2019-01-01

## 2019-07-22 NOTE — PROGRESS NOTES
Physical Therapy  Facility/Department: [facility]  Daily Treatment Note  NAME: Kailyn Washington  : 1955  MRN: [id number]    Date of Service: 2019    Discharge Recommendations:  Home with Home health PT, Home with assist PRN        Assessment   Body structures, Functions, Activity limitations: Decreased functional mobility ; Decreased strength;Decreased endurance;Decreased balance;Decreased safe awareness  Assessment: pt recently finished with OT treatment and requesting back to bed  Activity Tolerance  Activity Tolerance: Patient limited by endurance; Patient limited by fatigue     Patient Diagnosis(es): The primary encounter diagnosis was Gross hematuria. A diagnosis of Acute cystitis with hematuria was also pertinent to this visit. has a past medical history of Acid reflux, Arthritis, CAD (coronary artery disease), Endocarditis, Enterococcal sepsis ([facility] [address] Pancreatic carcinoma ([facility] [address] Positive cardiac stress test, Prostate cancer [facility]), Prosthetic valve endocarditis, subsequent encounter, and Unspecified cerebral artery occlusion with cerebral infarction. has a past surgical history that includes Tonsillectomy; other surgical history; Colonoscopy (2015); Cardiac valve replacement (3670,8517); hernia repair (2015); Cystoscopy (8/18/15); Cystoscopy (2016); Aortic valve replacement (); Cystocopy (2019); Cystoscopy (N/A, 2019); Cystocopy (2019); and Cystoscopy (N/A, 2019). Restrictions  Restrictions/Precautions  Restrictions/Precautions: General Precautions, Fall Risk  Required Braces or Orthoses?: No  Position Activity Restriction  Other position/activity restrictions: Up with assist, RUE IV, increased BLE edema   Subjective   General  Chart Reviewed:  Yes  Additional Pertinent Hx: pancreatic cancer, CAD, endocarditis  Subjective  Subjective: Pt agreeable to PT  General Comment  Comments:    Pain Screening  Patient Currently in Pain: Denies  Pain Plan  Times per week: 1-2x/D,5-6d/week  Current Treatment Recommendations: Strengthening, Balance Training, Functional Mobility Training, Transfer Training, Endurance Training, Gait Training, Home Exercise Program, Safety Education & Training, Patient/Caregiver Education & Training, Equipment Evaluation, Education, & procurement  Safety Devices  Type of devices: Nurse notified, Left in chair, Gait belt, Call light within reach, All fall risk precautions in place     Therapy Time   Individual Concurrent Group Co-treatment   Time In  1135         Time Out  1200         Minutes  25                 [address], [place]

## 2019-07-22 NOTE — PROGRESS NOTES
David Gracia                                                                                  Urology Progress Note            Subjective: follow-up hematuria radiation cystitis prostate cancer    Patient Vitals for the past 24 hrs:   BP Temp Temp src Pulse Resp SpO2   07/22/19 0505 96/61 97.5 °F (36.4 °C) Oral 81 20 100 %   07/22/19 0211 (!) 128/51 97.4 °F (36.3 °C) Oral -- -- --   07/22/19 0037 103/67 97.9 °F (36.6 °C) Oral 88 16 98 %   07/21/19 2106 102/61 97.7 °F (36.5 °C) Oral 84 20 97 %   07/21/19 1647 101/62 97.3 °F (36.3 °C) Oral 85 18 99 %   07/21/19 1215 98/60 97.3 °F (36.3 °C) Oral 87 16 100 %   07/21/19 0848 114/71 98.2 °F (36.8 °C) Oral 86 16 98 %       Intake/Output Summary (Last 24 hours) at 7/22/2019 0741  Last data filed at 7/22/2019 0211  Gross per 24 hour   Intake 1010 ml   Output 750 ml   Net 260 ml       Recent Labs     07/19/19  1740  07/20/19  0555  07/21/19  0025 07/21/19  0845 07/22/19  0602   WBC  --   --  24.7*  --   --   --   --    HGB 8.0*   < > 8.4*  8.3*   < > 7.9* 8.4* 8.4*   HCT 25.6*   < > 26.8*  26.5*   < > 24.9* 27.0* 27.4*   MCV  --   --  94.7  --   --   --   --    PLT 66*  --  See Reflexed IPF Result  75*  --   --   --   --     < > = values in this interval not displayed.      Recent Labs     07/22/19  0602      K 3.8      CO2 24   BUN 34*   CREATININE 1.02       Recent Labs     07/20/19  2338   COLORU YELLOW   PHUR 5.5   WBCUA 2 TO 5   RBCUA 2 TO 5   MUCUS 1+*   TRICHOMONAS NOT REPORTED   YEAST NOT REPORTED   BACTERIA NOT REPORTED   SPECGRAV 1.015   LEUKOCYTESUR TRACE*   UROBILINOGEN ELEVATED*   BILIRUBINUR NEGATIVE       Additional Lab/culture results:    Physical Exam: Patient alert and oriented not in acute distress,, vital signs stable voiding well without catheter    Interval Imaging Findings:    Impression:    Patient Active Problem List   Diagnosis    TIA (transient ischemic attack)    Rectal bleeding    Bilateral

## 2019-07-22 NOTE — FLOWSHEET NOTE
Patient was sleep as the writer entered the room. Writer gave a silent prayer of healing, comfort, and rest during his stay. Spiritual care will follow up as needed or requested.      07/22/19 1007   Encounter Summary   Services provided to: Patient   Referral/Consult From: [facility]   (7/22/2019)   Complexity of Encounter Low   Length of Encounter 15 minutes   Spiritual Assessment Completed Yes   Routine   Type Follow up   Assessment Sleeping   Intervention Prayer

## 2019-07-22 NOTE — PROGRESS NOTES
MD     Current Facility-Administered Medications   Medication Dose Route Frequency Provider Last Rate Last Dose    HYDROmorphone (DILAUDID) tablet 1 mg  1 mg Oral BID PRN Claudia Green MD        bumetanide (BUMEX) injection 1 mg  1 mg Intravenous BID Claudia Green MD   1 mg at 07/22/19 0903    midodrine (PROAMATINE) tablet 10 mg  10 mg Oral TID  Vishal Hernández MD   10 mg at 07/22/19 0648    pantoprazole (PROTONIX) tablet 40 mg  40 mg Oral QAM  Vishal Hernández MD   40 mg at 07/22/19 7885    0.9 % sodium chloride bolus  250 mL Intravenous Once Alessandro Ayala MD        warfarin (COUMADIN) tablet 2 mg  2 mg Oral Daily Alessandro Ayala MD   2 mg at 07/21/19 1657    albumin human 25 % solution 25 g  25 g Intravenous Daily Alessandro Ayala MD   25 g at 07/22/19 0857    oxyCODONE (ROXICODONE) immediate release tablet 5 mg  5 mg Oral Q6H PRN Alessandro Ayala MD   5 mg at 07/22/19 0912    lubiprostone (AMITIZA) capsule 8 mcg  8 mcg Oral BID  Alessandro Ayala MD   8 mcg at 07/22/19 1221    warfarin (COUMADIN) daily dosing (placeholder)   Other RX Placeholder Alessandro Ayala MD        sodium chloride flush 0.9 % injection 10 mL  10 mL Intravenous 2 times per day Alessandro Ayala MD   10 mL at 07/22/19 0906    sodium chloride flush 0.9 % injection 10 mL  10 mL Intravenous PRN Alessandro Ayala MD   10 mL at 07/12/19 0252    cefTRIAXone (ROCEPHIN) 1 g IVPB in 50 mL D5W minibag  1 g Intravenous Q24H Alessandro Ayala MD   Stopped at 07/21/19 2142    therapeutic multivitamin-minerals 1 tablet  1 tablet Oral Daily Alessandro Ayala MD   1 tablet at 07/22/19 1261    prochlorperazine (COMPAZINE) tablet 10 mg  10 mg Oral Q6H PRN Alessandro Ayala MD   10 mg at 07/21/19 2159    lidocaine (XYLOCAINE) 2% uro-jet   Topical PRN Alessandro Ayala MD        sennosides-docusate sodium (SENOKOT-S) 8.6-50 MG tablet 1 tablet  1 tablet Oral Daily PRN Alessandro Ayala MD   1 tablet at 07/18/19 9906       Allergies:  Patient Culture NO GROWTH    CULTURE BLOOD #1   Result Value Ref Range    Specimen Description . BLOOD     Special Requests NOT REPORTED     Culture NO GROWTH 6 DAYS    Cult,Urine,CC   Result Value Ref Range    Specimen Description . URINE     Special Requests NOT REPORTED     Culture NO GROWTH    Urine culture via catheter   Result Value Ref Range    Specimen Description . CATHETERIZED URINE     Special Requests NOT REPORTED     Culture NO GROWTH    CBC Auto Differential   Result Value Ref Range    WBC 22.1 (H) 3.5 - 11.3 k/uL    RBC 2.90 (L) 4.21 - 5.77 m/uL    Hemoglobin 7.9 (L) 13.0 - 17.0 g/dL    Hematocrit 25.8 (L) 40.7 - 50.3 %    MCV 89.0 82.6 - 102.9 fL    MCH 27.2 25.2 - 33.5 pg    MCHC 30.6 28.4 - 34.8 g/dL    RDW 17.0 (H) 11.8 - 14.4 %    Platelets 079 734 - 385 k/uL    MPV 12.2 8.1 - 13.5 fL    NRBC Automated 0.0 0.0 per 100 WBC    Differential Type NOT REPORTED     WBC Morphology NOT REPORTED     RBC Morphology NOT REPORTED     Platelet Estimate NOT REPORTED     Seg Neutrophils 81 (H) 36 - 66 %    Lymphocytes 6 (L) 24 - 44 %    Monocytes 10 (H) 1 - 7 %    Eosinophils % 1 1 - 4 %    Basophils 1 %    Immature Granulocytes 1 (H) 0 %    Segs Absolute 17.90 (H) 1.8 - 7.7 k/uL    Absolute Lymph # 1.33 1.0 - 4.8 k/uL    Absolute Mono # 2.21 (H) 0.2 - 0.8 k/uL    Absolute Eos # 0.22 0.0 - 0.4 k/uL    Basophils # 0.22 (H) 0.0 - 0.2 k/uL    Absolute Immature Granulocyte 0.22 0.00 - 0.30 k/uL    Morphology HYPOCHROMIA PRESENT     Morphology 1+ POLYCHROMASIA    Basic Metabolic Panel   Result Value Ref Range    Glucose 105 (H) 70 - 99 mg/dL    BUN 23 8 - 23 mg/dL    CREATININE 0.96 0.70 - 1.20 mg/dL    Bun/Cre Ratio 24 (H) 9 - 20    Calcium 8.1 (L) 8.6 - 10.4 mg/dL    Sodium 132 (L) 135 - 144 mmol/L    Potassium 4.8 3.7 - 5.3 mmol/L    Chloride 100 98 - 107 mmol/L    CO2 18 (L) 20 - 31 mmol/L    Anion Gap 14 9 - 17 mmol/L    GFR Non-African American >60 >60 mL/min    GFR African American >60 >60 mL/min    GFR Comment DIONI  [facility] Rec: [id number]  Path Number: [id number]  Collected: 7/12/2019  Received: 7/15/2019  Reported: 7/16/2019 09:40    -- Diagnosis --  BLADDER CONTENTS:  ACUTE BLOOD CLOTS. NEGATIVE FOR MALIGNANCY. SHEILA Cartagena  **Electronically Signed Out**         ajb/7/16/2019       Clinical Information  Pre-op Diagnosis:  GROSS HEMATURIA   Operative Findings:  CYSTO URINE (URINE RT REFLEX TO CULTURE); BLADDER  CLOTS  Operation Performed:  CYSTOSCOPY WITH BLADDER IRRIGATION    Source of Specimen  1: BLADDER CLOTS (A)    Gross Description  \"DIONI KNOWLES, BLADDER CLOTS\" Fragments of dark red clot, 5.5 x 4.0  x 1.5 cm in aggregate. No obvious tissue is identified. Portion 1cs. tm      Microscopic Description  Microscopic examination performed.       Hgb/Hct   Result Value Ref Range    Hemoglobin 9.3 (L) 13.0 - 17.0 g/dL    Hematocrit 28.9 (L) 40.7 - 50.3 %   Hgb/Hct   Result Value Ref Range    Hemoglobin 8.9 (L) 13.0 - 17.0 g/dL    Hematocrit 28.3 (L) 40.7 - 50.3 %   PROTIME-INR   Result Value Ref Range    Protime 16.5 (H) 9.7 - 11.6 sec    INR 1.6    Basic Metabolic Prof   Result Value Ref Range    Glucose 90 70 - 99 mg/dL    BUN 35 (H) 8 - 23 mg/dL    CREATININE 0.99 0.70 - 1.20 mg/dL    Bun/Cre Ratio 35 (H) 9 - 20    Calcium 7.6 (L) 8.6 - 10.4 mg/dL    Sodium 132 (L) 135 - 144 mmol/L    Potassium 4.3 3.7 - 5.3 mmol/L    Chloride 99 98 - 107 mmol/L    CO2 20 20 - 31 mmol/L    Anion Gap 13 9 - 17 mmol/L    GFR Non-African American >60 >60 mL/min    GFR African American >60 >60 mL/min    GFR Comment          GFR Staging NOT REPORTED    Urinalysis, Routine   Result Value Ref Range    Color, UA YELLOW YELLOW    Turbidity UA CLOUDY (A) CLEAR    Glucose, Ur NEGATIVE NEGATIVE    Bilirubin Urine NEGATIVE NEGATIVE    Ketones, Urine NEGATIVE NEGATIVE    Specific Gravity, UA 1.015 1.005 - 1.030    Urine Hgb 3+ (A) NEGATIVE    pH, UA 5.5 5.0 - 8.0    Protein, UA 1+ (A) NEGATIVE    Urobilinogen, Urine Normal Normal 50.3 %   Microscopic Urinalysis   Result Value Ref Range    -          WBC, UA 5 TO 10 0 - 5 /HPF    RBC, UA TOO NUMEROUS TO COUNT 0 - 2 /HPF    Casts UA NOT REPORTED /LPF    Crystals UA NOT REPORTED None /HPF    Epithelial Cells UA 2 TO 5 0 - 5 /HPF    Renal Epithelial, Urine NOT REPORTED 0 /HPF    Bacteria, UA NOT REPORTED None    Mucus, UA NOT REPORTED None    Trichomonas, UA NOT REPORTED None    Amorphous, UA NOT REPORTED None    Other Observations UA NOT REPORTED NOT REQ. Yeast, UA NOT REPORTED None   Hgb/Hct   Result Value Ref Range    Hemoglobin 8.0 (L) 13.0 - 17.0 g/dL    Hematocrit 25.5 (L) 40.7 - 50.3 %   Hgb/Hct   Result Value Ref Range    Hemoglobin 8.1 (L) 13.0 - 17.0 g/dL    Hematocrit 26.0 (L) 40.7 - 50.3 %   Liver Profile   Result Value Ref Range    Alb 3.3 (L) 3.5 - 5.2 g/dL    Alkaline Phosphatase 282 (H) 40 - 129 U/L    ALT 44 (H) 5 - 41 U/L     (H) <40 U/L    Total Bilirubin 1.31 (H) 0.3 - 1.2 mg/dL    Bilirubin, Direct 0.64 (H) <0.31 mg/dL    Bilirubin, Indirect 0.67 0.00 - 1.00 mg/dL    Total Protein 5.8 (L) 6.4 - 8.3 g/dL    Globulin NOT REPORTED 1.5 - 3.8 g/dL    Albumin/Globulin Ratio NOT REPORTED 1.0 - 2.5   APTT   Result Value Ref Range    PTT 34.3 (H) 23 - 31 sec   Fibrinogen   Result Value Ref Range    Fibrinogen 85 (L) 170 - 400 mg/dL   D-Dimer Test   Result Value Ref Range    D-Dimer, Quant 30.16 mg/L FEU   PROTIME-INR   Result Value Ref Range    Protime 23.9 (H) 9.7 - 11.6 sec    INR 2.4    Hgb/Hct   Result Value Ref Range    Hemoglobin 7.6 (L) 13.0 - 17.0 g/dL    Hematocrit 23.9 (L) 40.7 - 50.3 %   Hgb/Hct   Result Value Ref Range    Hemoglobin 8.0 (L) 13.0 - 17.0 g/dL    Hematocrit 25.6 (L) 40.7 - 50.3 %   Hgb/Hct   Result Value Ref Range    Hemoglobin 7.9 (L) 13.0 - 17.0 g/dL    Hematocrit 25.6 (L) 40.7 - 50.3 %   Urinalysis Reflex to Culture   Result Value Ref Range    Color, UA Azo dye present.  (A) YELLOW    Turbidity UA CLOUDY (A) CLEAR    Glucose, Ur NOT REPORTED scattered nodules in the bilateral lung bases that or not clearly present on 07/28/2017. Coronary artery atherosclerotic vascular calcifications. Status post aortic valve replacement. Organs: Lack of intravenous contrast limits evaluation of the solid organs, vascular structures, and bowel. There are numerous large ill-defined hypodense lesions in the liver essentially replacing the left hepatic lobe and to a lesser degree involving the right hepatic lobe. The liver is enlarged with a nodular surface reflecting the underlying lesions. Status post cholecystectomy. No biliary ductal dilatation. Status post resection of the pancreatic head and uncinate process. The residual body and tail are grossly unremarkable. The spleen and bilateral adrenal glands are unremarkable. The bilateral kidneys and ureters are unremarkable. GI/Bowel: Normal appendix. The colon is unremarkable. Status post partial gastrectomy. No bowel obstruction or abnormal bowel wall thickening. Pelvis: 4 cm soft tissue density dependently within the urinary bladder lumen. No calcifications within the lumen. Prostatic radiation beads are noted. No free fluid in the pelvis. No pelvic or inguinal lymphadenopathy. Peritoneum/Retroperitoneum: The abdominal aorta is normal in caliber with moderate calcific plaquing. There is significant retroperitoneal lymphadenopathy new from 2017. This includes a 5.2 x 3.2 cm node on axial image 89. Small volume of ascites. No pneumoperitoneum. Bones/Soft Tissues: Postoperative scarring in the anterior abdominal wall. Status post median sternotomy. No acute or suspicious osseous abnormality. 1. Findings compatible with metastatic disease including numerous large ill-defined hepatic lesions, bulky retroperitoneal lymphadenopathy, and small scattered pulmonary nodules in the lung bases new from 2017. Small volume of ascites which may be malignant.  2. Status post partial pancreatectomy and gastrectomy. No bowel obstruction. 3. 4 cm soft tissue density within the urinary bladder lumen may represent clot with a soft tissue mass not excluded. No obstructive uropathy or urinary collecting system calculi. Consider further evaluation with cystoscopy. 4. Small left pleural effusion. Xr Chest (single View Frontal)    Result Date: 7/15/2019  EXAMINATION: ONE XRAY VIEW OF THE CHEST 7/15/2019 10:27 pm COMPARISON: 05/13/2017. HISTORY: ORDERING SYSTEM PROVIDED HISTORY: pulmonary vascular congestion TECHNOLOGIST PROVIDED HISTORY: pulmonary vascular congestion Reason for Exam: Pulmonary vascular congestion Acuity: Acute Type of Exam: Unknown FINDINGS: Sternotomy wires noted. The cardiomediastinal silhouette is mildly in size and contour. The lungs are clear. No pleural effusion or pneumothorax is present. No acute cardiopulmonary process     Ir Ultrasound Guidance Vascular Access    Result Date: 7/19/2019  Radiology exam is complete. No Radiologist dictation. Please follow up with ordering provider. Primary Problem  Gross hematuria    Active Hospital Problems    Diagnosis Date Noted    Severe malnutrition ([address] [E43] 07/14/2019     Priority: Medium     Class: Present on Admission    Edema due to hypoalbuminemia [R60.9, E88.09] 07/16/2019    Malnutrition ([address] [E46] 07/15/2019    Hemorrhagic cystitis [N30.91] 07/13/2019    Gross hematuria [R31.0] 07/11/2019    Acute blood loss anemia [D62] 07/11/2019    Metastasis from pancreatic cancer ([address] [C79.9, C25.9] 07/11/2019    Urinary tract infection [N39.0] 07/10/2019    UTI (urinary tract infection) [N39.0] 07/10/2019       Low fibrinogen likely secondary to DIC  History of prostate cancer status post radiation therapy and androgen deprivation therapy 2014  Colloid pancreas carcinoma status post Whipple, pathological stage T3N2, patient refused adjuvant chemotherapy.   Liver mass and pulmonary nodules likely metastasis from colonic pancreas carcinoma however patient refused biopsy  Hematuria  Chronic anticoagulation due to mechanical heart valve  Chronic infective endocarditis    RECOMMENDATIONS:  Personally reviewed results of lab work-up and other relevant clinical data with the patient and his wife    Patient denies any more bleeding. H&H is relatively stable. Given no active bleeding. Okay to restart anticoagulation for patient's mechanical valve. Patient will need close monitoring for bleeding. Continue to monitor for bleeding and transfuse    Revisited patient goals and expectations. He is not interested in getting biopsy of the liver mass. Prostate cancer seems to be in remission. PSA has not changed over the last 6 months at least.    Hematuria is most likely due to the local vascular abnormality/prostatic varices however coagulopathy is making it worse. Transfuse to keep hemoglobin above 7    Discussed with patient and Nurse. Patient started on lactulose. Prognosis is guarded due to extensive metastatic disease. Thank you for asking us to see this patient. Fco De La Cruz MD          This note is created with the assistance of a speech recognition program.  While intending to generate a document that actually reflects the content of the visit, the document can still have some errors including those of syntax and sound a like substitutions which may escape proof reading. It such instances, actual meaning can be extrapolated by contextual diversion.

## 2019-07-23 NOTE — PROGRESS NOTES
Hieu Fowler                                                                                  Urology Progress Note            Subjective:  Follow-up gross hematuria prostate cancer    Patient Vitals for the past 24 hrs:   BP Temp Temp src Pulse Resp SpO2   07/23/19 1139 101/62 97.3 °F (36.3 °C) Oral 90 18 100 %   07/23/19 0821 96/60 98.1 °F (36.7 °C) Oral 92 16 100 %   07/23/19 0429 (!) 93/50 98.2 °F (36.8 °C) Oral 82 18 100 %   07/23/19 0110 106/67 98.4 °F (36.9 °C) Oral 84 20 98 %   07/22/19 2040 94/65 98.2 °F (36.8 °C) Oral 88 20 95 %   07/22/19 1653 102/65 98.2 °F (36.8 °C) Oral -- 20 100 %       Intake/Output Summary (Last 24 hours) at 7/23/2019 1331  Last data filed at 7/23/2019 1139  Gross per 24 hour   Intake --   Output 550 ml   Net -550 ml       Recent Labs     07/21/19  0845 07/22/19  0602 07/23/19  0536   HGB 8.4* 8.4* 8.3*   HCT 27.0* 27.4* 26.5*     Recent Labs     07/22/19  0602      K 3.8      CO2 24   BUN 34*   CREATININE 1.02       Recent Labs     07/20/19  2338   COLORU YELLOW   PHUR 5.5   WBCUA 2 TO 5   RBCUA 2 TO 5   MUCUS 1+*   TRICHOMONAS NOT REPORTED   YEAST NOT REPORTED   BACTERIA NOT REPORTED   SPECGRAV 1.015   LEUKOCYTESUR TRACE*   UROBILINOGEN ELEVATED*   BILIRUBINUR NEGATIVE       Additional Lab/culture results:    Physical Exam: Patient sitting at bedside  Not in acute distress he states he did have a small amount of bleeding  But it was minimal dysuria has improved, no bladder distention    Interval Imaging Findings:    Impression:    Patient Active Problem List   Diagnosis    TIA (transient ischemic attack)    Rectal bleeding    Bilateral carotid artery stenosis    Arteriovenous malformation of colon    Disc disease, degenerative, cervical    Dizziness    S/P heart valve replacement with mechanical valve    Umbilical hernia    Hematuria    Dysuria    Radiation cystitis    Radiation proctitis    CHB (complete heart block)

## 2019-07-23 NOTE — PROGRESS NOTES
Alert, in no distress. BP: 101/62 - on Midodrine. Hb stable at 8.3  NH3 down to 74 from 107 - continue Lactulose. NSR, no ectopics. Lungs clear. Abdomen: more distended to day. Positive for shifting dullness consistent with Ascites. - Abd ultrasound ordered - may need abd paracentesis.   Re check U/A

## 2019-07-23 NOTE — PROGRESS NOTES
Unspecified cerebral artery occlusion with cerebral infarction. has a past surgical history that includes Tonsillectomy; other surgical history; Colonoscopy (4/5/2015); Cardiac valve replacement (3955,9158); hernia repair (4/2015); Cystoscopy (8/18/15); Cystoscopy (12/02/2016); Aortic valve replacement (1989); Cystocopy (07/12/2019); Cystoscopy (N/A, 7/12/2019); Cystocopy (07/19/2019); and Cystoscopy (N/A, 7/19/2019). Restrictions  Restrictions/Precautions  Restrictions/Precautions: General Precautions, Fall Risk  Required Braces or Orthoses?: No  Position Activity Restriction  Other position/activity restrictions: Up with assist/satish, RUE IV, BLE edema/B Teds   Subjective   General  Chart Reviewed: Yes  Patient assessed for rehabilitation services?: Yes  Family / Caregiver Present: No  Patient Currently in Pain: Denies(pt states he has no pain and just some abd discomfort )   Orientation  Orientation  Overall Orientation Status: Within Functional Limits  Objective    ADL  Grooming: Setup;Stand by assistance(for hair and oral care standing at sink with RW )  UE Bathing: Setup;Stand by assistance(for sponge bath seated EOB )  LE Bathing: Setup;Stand by assistance(to stand and wash jennifer area with RW )  UE Dressing: Setup;Minimal assistance(with donning clean hosp gown )  LE Dressing: (DEP for donning B William hose to reduce swelling/pt was edu on benefits/purpose of wear, wear schedule, care. , and assist needed at DC for keshav/doff if MD orders to continue of wear with good verbalization of understanding.   Pt declined any LB dressing tasks this date as he states he is on a diuretic and has to urinate so often and clothing mgt is difficult to complete with LB clothing on; min assist though to adjust B  socks)  Toileting: Minimal assistance(with gown mgt as pt stood with RW to use urinal )        Balance  Standing Balance: Stand by assistance(with RW )  Standing Balance  Time: stand satish 3 plus mins with RW during self care/functional tasks   Functional Mobility  Functional - Mobility Device: Rolling Walker  Activity: (bed to bathroom and to recliner )  Assist Level: Stand by assistance  Functional Mobility Comments: verbal instruction needed for RW safety, pacing self, looking up and scanning room to increase pt's safety   Toilet Transfers  Toilet Transfers Comments: N/T   Bed mobility  Supine to Sit: Minimal assistance  Sit to Supine: Unable to assess(pt agreed to sit up in recliner for breakfast )  Comment: verbal instruction needed for hand placement on rail and pursed lip breathing vs holding breath   Transfers  Stand Pivot Transfers: Stand by assistance(with RW )  Sit to stand: Stand by assistance  Stand to sit: Stand by assistance  Transfer Comments: verbal instruction needed for hand placement pushing from surface seated on vs RW, looking up and scanning room, and RW safety                       Cognition  Overall Cognitive Status: Exceptions  Arousal/Alertness: Appropriate responses to stimuli  Following Commands:  Follows all commands without difficulty  Attention Span: Appears intact  Memory: Decreased short term memory  Safety Judgement: Decreased awareness of need for safety  Problem Solving: Assistance required to identify errors made;Assistance required to correct errors made;Decreased awareness of errors  Insights: Decreased awareness of deficits  Initiation: Requires cues for some  Sequencing: Does not require cues                                         Plan   Plan  Times per week: 4-5x/week 1x/day as satish   Current Treatment Recommendations: Balance Training, Functional Mobility Training, Endurance Training, Safety Education & Training, Patient/Caregiver Education & Training, Self-Care / ADL                                                    AM-PAC Score   19     AM-[facility] Inpatient Daily Activity Raw Score: 19 (07/22/19 1222)  AM-PAC Inpatient ADL T-Scale Score : 40.22 (07/22/19 1222)  ADL Inpatient CMS 0-100%

## 2019-07-23 NOTE — PROGRESS NOTES
Today's Date: 7/23/2019  Patient Name: Trey Ellis  Date of admission: 7/10/2019  4:07 PM  Patient's age: 71 y.o., 1955  Admission Dx: Urinary tract infection [N39.0]  UTI (urinary tract infection) [N39.0]    Reason for Consult: management recommendations  Requesting Physician: Aline Caputo MD    CHIEF COMPLAINT: Prostate cancer, mucinous adenocarcinoma of pancreas, coagulopathy and hematuria    History Obtained From:  patient, spouse, electronic medical record    HISTORY OF PRESENT ILLNESS:      The patient is a 71 y.o.  male who is admitted to the hospital with chief complaint of hematuria. Patient has a complicated past medical history. He was diagnosed with prostate cancer back in 2014 and treated with radiation therapy and androgen deprivation therapy for 2-year. 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 Pathological stage was T3N2. Patient refused adjuvant chemotherapy. Later he was noted to have a mass on the liver concerning for metastasis. Patient refused biopsy of the mass and has been on naturopathic medication for his cancer of the pancreas. Latest CT from July 2019 shows disease progression. Patient also has history of valve replacement. He has mechanical heart valve and has been on Coumadin. 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 Patient has been receiving supportive care with blood transfusions. Patient also has chronic infective endocarditis and is on ampicillin at home. He is currently receiving Rocephin. Patient states he has not had any more hematuria over the last 24 hours or so. Does complain of occasional pain. Complains of generalized weakness. He is not interested in getting a biopsy at this point. Interval history:    Patient seen and examined.   Labs and vitals MULTIVITAMIN-MINERALS) tablet Take 1 tablet by mouth daily.     Historical Provider, MD     Current Facility-Administered Medications   Medication Dose Route Frequency Provider Last Rate Last Dose    warfarin (COUMADIN) tablet 3 mg  3 mg Oral Daily Chi Amanuels, APRN - CNP   3 mg at 07/22/19 1837    lactulose (CHRONULAC) 10 GM/15ML solution 20 g  20 g Oral TID Javier Mukherjee MD   20 g at 07/22/19 2149    HYDROmorphone (DILAUDID) tablet 1 mg  1 mg Oral BID PRN Javier Mukherjee MD        bumetanide (BUMEX) injection 1 mg  1 mg Intravenous BID Javier Mukherjee MD   1 mg at 07/22/19 2149    midodrine (PROAMATINE) tablet 10 mg  10 mg Oral TID  Servando Kelley MD   10 mg at 07/22/19 1837    pantoprazole (PROTONIX) tablet 40 mg  [facility]uster [facility]   40 mg at 07/23/19 0700    0.9 % sodium chloride bolus  250 mL Intravenous Once Titus Helm MD        albumin human 25 % solution 25 g  25 g Intravenous Daily Titus Helm MD   25 g at 07/22/19 0857    oxyCODONE (ROXICODONE) immediate release tablet 5 mg  5 mg Oral Q6H PRN Titus Helm MD   5 mg at 07/23/19 0206    lubiprostone (AMITIZA) capsule 8 mcg  8 mcg Oral BID  Titus Helm MD   8 mcg at 07/22/19 1836    warfarin (COUMADIN) daily dosing (placeholder)   Other RX Placeholder Titus Helm MD        sodium chloride flush 0.9 % injection 10 mL  10 mL Intravenous 2 times per day Titus Helm MD   10 mL at 07/22/19 2149    sodium chloride flush 0.9 % injection 10 mL  10 mL Intravenous PRN Titus Helm MD   10 mL at 07/12/19 0252    cefTRIAXone (ROCEPHIN) 1 g IVPB in 50 mL D5W minibag  1 g Intravenous Q24H Titus Helm MD   Stopped at 07/22/19 1917    therapeutic multivitamin-minerals 1 tablet  1 tablet Oral Daily Titus Helm MD   1 tablet at 07/22/19 0903    prochlorperazine (COMPAZINE) tablet 10 mg  10 mg Oral Q6H PRN Titus Helm MD   10 mg at 07/21/19 6499    lidocaine (XYLOCAINE) 2% uro-jet BLOOD #1   Result Value Ref Range    Specimen Description . BLOOD     Special Requests NOT REPORTED     Culture NO GROWTH 6 DAYS    Cult,Urine,CC   Result Value Ref Range    Specimen Description . URINE     Special Requests NOT REPORTED     Culture NO GROWTH    Urine culture via catheter   Result Value Ref Range    Specimen Description . CATHETERIZED URINE     Special Requests NOT REPORTED     Culture NO GROWTH    CBC Auto Differential   Result Value Ref Range    WBC 22.1 (H) 3.5 - 11.3 k/uL    RBC 2.90 (L) 4.21 - 5.77 m/uL    Hemoglobin 7.9 (L) 13.0 - 17.0 g/dL    Hematocrit 25.8 (L) 40.7 - 50.3 %    MCV 89.0 82.6 - 102.9 fL    MCH 27.2 25.2 - 33.5 pg    MCHC 30.6 28.4 - 34.8 g/dL    RDW 17.0 (H) 11.8 - 14.4 %    Platelets 950 079 - 713 k/uL    MPV 12.2 8.1 - 13.5 fL    NRBC Automated 0.0 0.0 per 100 WBC    Differential Type NOT REPORTED     WBC Morphology NOT REPORTED     RBC Morphology NOT REPORTED     Platelet Estimate NOT REPORTED     Seg Neutrophils 81 (H) 36 - 66 %    Lymphocytes 6 (L) 24 - 44 %    Monocytes 10 (H) 1 - 7 %    Eosinophils % 1 1 - 4 %    Basophils 1 %    Immature Granulocytes 1 (H) 0 %    Segs Absolute 17.90 (H) 1.8 - 7.7 k/uL    Absolute Lymph # 1.33 1.0 - 4.8 k/uL    Absolute Mono # 2.21 (H) 0.2 - 0.8 k/uL    Absolute Eos # 0.22 0.0 - 0.4 k/uL    Basophils # 0.22 (H) 0.0 - 0.2 k/uL    Absolute Immature Granulocyte 0.22 0.00 - 0.30 k/uL    Morphology HYPOCHROMIA PRESENT     Morphology 1+ POLYCHROMASIA    Basic Metabolic Panel   Result Value Ref Range    Glucose 105 (H) 70 - 99 mg/dL    BUN 23 8 - 23 mg/dL    CREATININE 0.96 0.70 - 1.20 mg/dL    Bun/Cre Ratio 24 (H) 9 - 20    Calcium 8.1 (L) 8.6 - 10.4 mg/dL    Sodium 132 (L) 135 - 144 mmol/L    Potassium 4.8 3.7 - 5.3 mmol/L    Chloride 100 98 - 107 mmol/L    CO2 18 (L) 20 - 31 mmol/L    Anion Gap 14 9 - 17 mmol/L    GFR Non-African American >60 >60 mL/min    GFR African American >60 >60 mL/min    GFR Comment          GFR Staging NOT REPORTED Result Value Ref Range    -          WBC, UA 5 TO 10 0 - 5 /HPF    RBC, UA TOO NUMEROUS TO COUNT 0 - 2 /HPF    Casts UA NOT REPORTED /LPF    Crystals UA NOT REPORTED None /HPF    Epithelial Cells UA 2 TO 5 0 - 5 /HPF    Renal Epithelial, Urine NOT REPORTED 0 /HPF    Bacteria, UA NOT REPORTED None    Mucus, UA NOT REPORTED None    Trichomonas, UA NOT REPORTED None    Amorphous, UA NOT REPORTED None    Other Observations UA NOT REPORTED NOT REQ. Yeast, UA NOT REPORTED None   Hgb/Hct   Result Value Ref Range    Hemoglobin 8.0 (L) 13.0 - 17.0 g/dL    Hematocrit 25.5 (L) 40.7 - 50.3 %   Hgb/Hct   Result Value Ref Range    Hemoglobin 8.1 (L) 13.0 - 17.0 g/dL    Hematocrit 26.0 (L) 40.7 - 50.3 %   Liver Profile   Result Value Ref Range    Alb 3.3 (L) 3.5 - 5.2 g/dL    Alkaline Phosphatase 282 (H) 40 - 129 U/L    ALT 44 (H) 5 - 41 U/L     (H) <40 U/L    Total Bilirubin 1.31 (H) 0.3 - 1.2 mg/dL    Bilirubin, Direct 0.64 (H) <0.31 mg/dL    Bilirubin, Indirect 0.67 0.00 - 1.00 mg/dL    Total Protein 5.8 (L) 6.4 - 8.3 g/dL    Globulin NOT REPORTED 1.5 - 3.8 g/dL    Albumin/Globulin Ratio NOT REPORTED 1.0 - 2.5   APTT   Result Value Ref Range    PTT 34.3 (H) 23 - 31 sec   Fibrinogen   Result Value Ref Range    Fibrinogen 85 (L) 170 - 400 mg/dL   D-Dimer Test   Result Value Ref Range    D-Dimer, Quant 30.16 mg/L FEU   PROTIME-INR   Result Value Ref Range    Protime 23.9 (H) 9.7 - 11.6 sec    INR 2.4    Hgb/Hct   Result Value Ref Range    Hemoglobin 7.6 (L) 13.0 - 17.0 g/dL    Hematocrit 23.9 (L) 40.7 - 50.3 %   Hgb/Hct   Result Value Ref Range    Hemoglobin 8.0 (L) 13.0 - 17.0 g/dL    Hematocrit 25.6 (L) 40.7 - 50.3 %   Hgb/Hct   Result Value Ref Range    Hemoglobin 7.9 (L) 13.0 - 17.0 g/dL    Hematocrit 25.6 (L) 40.7 - 50.3 %   Urinalysis Reflex to Culture   Result Value Ref Range    Color, UA Azo dye present.  (A) YELLOW    Turbidity UA CLOUDY (A) CLEAR    Glucose, Ur NOT REPORTED NEGATIVE    Bilirubin Urine NOT REPORTED NEGATIVE    Ketones, Urine NOT REPORTED NEGATIVE    Specific Gravity, UA 1.015 1.005 - 1.030    Urine Hgb 3+ (A) NEGATIVE    pH, UA 7.0 5.0 - 8.0    Protein, UA NOT REPORTED NEGATIVE    Urobilinogen, Urine NOT REPORTED Normal    Nitrite, Urine NOT REPORTED NEGATIVE    Leukocyte Esterase, Urine NOT REPORTED NEGATIVE    Urinalysis Comments       Unable to report Acetoacetic Acid, Bilirubin, Glucose, Leukocyte Esterase, Nitrite, Protein and Urobilinogen due to the presence of a dye in the urine sample. Microscopic Urinalysis   Result Value Ref Range    -          WBC, UA 2 TO 5 0 - 5 /HPF    RBC, UA TOO NUMEROUS TO COUNT 0 - 2 /HPF    Casts UA NOT REPORTED /LPF    Crystals UA NOT REPORTED None /HPF    Epithelial Cells UA 0 TO 2 0 - 5 /HPF    Renal Epithelial, Urine NOT REPORTED 0 /HPF    Bacteria, UA RARE (A) None    Mucus, UA NOT REPORTED None    Trichomonas, UA NOT REPORTED None    Amorphous, UA NOT REPORTED None    Other Observations UA NOT REPORTED NOT REQ.     Yeast, UA NOT REPORTED None   Platelet Count   Result Value Ref Range    Platelets 66 (L) 885 - 453 k/uL   PROTIME-INR   Result Value Ref Range    Protime 21.4 (H) 9.7 - 11.6 sec    INR 2.1    Hgb/Hct   Result Value Ref Range    Hemoglobin 8.3 (L) 13.0 - 17.0 g/dL    Hematocrit 26.5 (L) 40.7 - 50.3 %   Hgb/Hct   Result Value Ref Range    Hemoglobin 8.4 (L) 13.0 - 17.0 g/dL    Hematocrit 27.0 (L) 40.7 - 50.3 %   Cancer Antigen 19-9   Result Value Ref Range    CA 19-9 753 (H) 0 - 35 U/mL   D-Dimer, Quantitative   Result Value Ref Range    D-Dimer, Quant 35.20 mg/L FEU   Iron and TIBC   Result Value Ref Range    Iron 52 (L) 59 - 158 ug/dL    TIBC 200 (L) 250 - 450 ug/dL    Iron Saturation 26 20 - 55 %    UIBC 148 112 - 347 ug/dL   Ferritin   Result Value Ref Range    Ferritin 667 (H) 30 - 400 ug/L   Fibrinogen   Result Value Ref Range    Fibrinogen 74 (L) 170 - 400 mg/dL   Platelet count   Result Value Ref Range    Platelets 75 (L) 197 - 453 k/uL cystitis [N30.91] 07/13/2019    Gross hematuria [R31.0] 07/11/2019    Acute blood loss anemia [D62] 07/11/2019    Metastasis from pancreatic cancer ([facility] [address] [C79.9, C25.9] 07/11/2019    Urinary tract infection [N39.0] 07/10/2019    UTI (urinary tract infection) [N39.0] 07/10/2019       Low fibrinogen likely secondary to DIC  History of prostate cancer status post radiation therapy and androgen deprivation therapy 2014  Colloid pancreas carcinoma status post Whipple, pathological stage T3N2, patient refused adjuvant chemotherapy. 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 Transfuse to keep hemoglobin above 7. Okay to anticoagulate given patient has mechanical valves. Closely monitor for hematuria. If patient hematuria significant will consider blood products after reevaluating DIC. Peripheral smear did not show significant number of schistocytes. Patient may have pseudocirrhosis from liver metastasis affecting liver function and causing coagulopathy. We will check a factor VIII to help distinguish. Discussed goals and expectations. Patient is full code as of right now. Will consult palliative care team.  Prostate cancer seems to be in remission. PSA has not changed over the last 6 months at least.  Prognosis is guarded due to extensive metastatic disease. Thank you for asking us to see this patient.           Tomer De La Cruz MD          This note is created with the assistance of a speech recognition program.  While intending to generate a document that actually reflects the content of the visit, the document can still have some errors including those of syntax and sound a like substitutions which

## 2019-07-23 NOTE — PLAN OF CARE
Intolerance:  Goal: Ability to tolerate increased activity will improve  Description  Ability to tolerate increased activity will improve  Outcome: Ongoing     Problem:  Bowel Function - Altered:  Goal: Bowel elimination is within specified parameters  Description  Bowel elimination is within specified parameters  Outcome: Ongoing     Problem: Nutrition Deficit:  Goal: Ability to achieve adequate nutritional intake will improve  Description  Ability to achieve adequate nutritional intake will improve  Outcome: Ongoing     Problem: Pain:  Goal: Ability to notify healthcare provider of pain before it becomes unmanageable or unbearable will improve  Description  Ability to notify healthcare provider of pain before it becomes unmanageable or unbearable will improve  Outcome: Ongoing  Goal: Control of acute pain  Description  Control of acute pain  Outcome: Ongoing  Goal: Control of chronic pain  Description  Control of chronic pain  Outcome: Ongoing     Problem: Nutrition  Goal: Optimal nutrition therapy  Description  Nutrition Problem: Severe malnutrition, In context of acute illness or injury  Intervention: Food and/or Nutrient Delivery: Continue current diet, Modify current ONS  Nutritional Goals: PO intake to meet >75% of estimated kcal/protein needs, with good GI tolerance   Outcome: Ongoing     Problem: Musculor/Skeletal Functional Status  Goal: Absence of falls  Outcome: Ongoing     Problem: IP BALANCE  Goal: LTG - Patient will maintain balance to allow for safe/functional mobility  Outcome: Ongoing     Problem: IP BATHING  Goal: STG - patient will bathe body  Outcome: Ongoing     Problem: IP DRESSINGS LOWER EXTREMITIES  Goal: LTG - patient will dress lower body with or without assistive device  Outcome: Ongoing     Problem: IP DRESSING UPPER EXTREMITIES  Goal: STG - patient will dress upper body with or without assistive device  Outcome: Ongoing     Problem: IP GROOMING  Goal: LTG - patient will complete daily

## 2019-07-23 NOTE — CONSULTS
disease and he states he has refused liver bx in the past. I explained to him how the liver functions and why he has to take lactulose. Pt states he has not received any chemotherapy as his deconditioned state would not allow it. I explained that without treatment, his (presumable) cancer will continue to grow and cause him problems. I told him that he has extensive disease and if involves the liver, it become incurable. Pt states he has been taking mushroom power to detox the liver and wonder if that is what is causing his liver problems. I explained it was the disease causing his liver problems. CODE STATUS: I did talk with the patient about his wishes re: CPR/intubation. Pt states, \"That would probably hurt me? \" I state it is possible that we can crack or break ribs with CPR. Pt states, \"Yeah, I don't think I want that, just let me go\". I explained that people with advanced disease such as his sometimes opt for [facility] (no intubation) because he will continue to get treatment as needed but if heart/breathing stopped, we would focus on comfort care and allowing natural death. Pt states he will talk with his PCP about it. GOALS OF CARE: I did talk at length with patient and wife about home-based palliative care for symptom management . Brochure given to wife and she will talk with PCP about it. Wife seems interested. Referral to [facility] will be made if patient decides he wants it. Will follow up tomorrow to continue conversations. PCP to address code status wishes with patient and possible palliative care at home.       Education/support to family  Education/support to patient  Discharge planning/helping to coordinate care  Communications with primary service  Providing support for coping/adaptation/distress of family  Discussing meaning/purpose   Continue with current plan of care  Clarification of medical condition to patient and family  Code status clarified: Full Code  Discussed disease process, code

## 2019-07-24 NOTE — PLAN OF CARE
Problem: Falls - Risk of:  Goal: Will remain free from falls  Description  Will remain free from falls  Outcome: Ongoing  Note:   Pt fall risk, fall band present, falling star, safety alarm activated and in use as needed. Hourly rounding performed. Pt encouraged to use call light. See Chiki Amaya fall risk assessment. Will continue to monitor patient closely. Goal: Absence of physical injury  Description  Absence of physical injury  Outcome: Ongoing     Problem: Pain:  Goal: Pain level will decrease  Description  Pain level will decrease  Outcome: Ongoing  Note:   Will continue to monitor patient's pain closely. Goal: Control of acute pain  Description  Control of acute pain  Outcome: Ongoing  Goal: Control of chronic pain  Description  Control of chronic pain  Outcome: Ongoing     Problem: ABCDS Injury Assessment  Goal: Absence of physical injury  Outcome: Ongoing     Problem: Urinary Elimination:  Goal: Signs and symptoms of infection will decrease  Description  Signs and symptoms of infection will decrease  Outcome: Ongoing  Goal: Ability to reestablish a normal urinary elimination pattern will improve - after catheter removal  Description  Ability to reestablish a normal urinary elimination pattern will improve  Outcome: Ongoing  Goal: Complications related to the disease process, condition or treatment will be avoided or minimized  Description  Complications related to the disease process, condition or treatment will be avoided or minimized  Outcome: Ongoing     Problem: Risk for Impaired Skin Integrity  Goal: Tissue integrity - skin and mucous membranes  Description  Structural intactness and normal physiological function of skin and  mucous membranes.   Outcome: Ongoing     Problem: Discharge Planning:  Goal: Participates in care planning  Description  Participates in care planning  Outcome: Ongoing  Goal: Discharged to appropriate level of care  Description  Discharged to appropriate level of care  Outcome:

## 2019-07-24 NOTE — PROGRESS NOTES
MULTIVITAMIN-MINERALS) tablet Take 1 tablet by mouth daily.     Historical Provider, MD     Current Facility-Administered Medications   Medication Dose Route Frequency Provider Last Rate Last Dose    warfarin (COUMADIN) tablet 3 mg  3 mg Oral Daily BRENDA Moore CNP   3 mg at 07/23/19 1741    lactulose (CHRONULAC) 10 GM/15ML solution 20 g  20 g Oral TID Tahir Melara MD   20 g at 07/24/19 1316    HYDROmorphone (DILAUDID) tablet 1 mg  1 mg Oral BID PRN Tahir Melara MD        bumetanide (BUMEX) injection 1 mg  1 mg Intravenous BID Tahir Melara MD   1 mg at 07/24/19 0815    midodrine (PROAMATINE) tablet 10 mg  10 mg Oral TID  Valentine Johnson MD   10 mg at 07/24/19 1325    pantoprazole (PROTONIX) tablet 40 mg  40 mg Oral QAM  Justine Schuster MD   40 mg at 07/24/19 9874    0.9 % sodium chloride bolus  250 mL Intravenous Once Anahi Canela MD        albumin human 25 % solution 25 g  25 g Intravenous Daily Anahi Canela MD   25 g at 07/24/19 0816    oxyCODONE (ROXICODONE) immediate release tablet 5 mg  5 mg Oral Q6H PRN Anahi Canela MD   5 mg at 07/24/19 3690    lubiprostone (AMITIZA) capsule 8 mcg  8 mcg Oral BID  Anahi Canela MD   8 mcg at 07/24/19 0815    warfarin (COUMADIN) daily dosing (placeholder)   Other RX Placeholder Anahi Canela MD        sodium chloride flush 0.9 % injection 10 mL  10 mL Intravenous 2 times per day Anahi Canela MD   10 mL at 07/24/19 0815    sodium chloride flush 0.9 % injection 10 mL  10 mL Intravenous PRN Anahi Canela MD   10 mL at 07/12/19 0252    cefTRIAXone (ROCEPHIN) 1 g IVPB in 50 mL D5W minibag  1 g Intravenous Q24H Anahi Canela MD   Stopped at 07/23/19 1810    therapeutic multivitamin-minerals 1 tablet  1 tablet Oral Daily Anahi Canela MD   1 tablet at 07/24/19 0815    prochlorperazine (COMPAZINE) tablet 10 mg  10 mg Oral Q6H PRN Anahi Canela MD   10 mg at 07/21/19 5750    lidocaine (XYLOCAINE) 2% uro-jet Topical PRN Arpit Lui MD        sennosides-docusate sodium (SENOKOT-S) 8.6-50 MG tablet 1 tablet  1 tablet Oral Daily PRN Arpit Liu MD   1 tablet at 19 2748       Allergies:  Patient has no known allergies. Social History:   reports that he has quit smoking. He has never used smokeless tobacco. He reports that he does not drink alcohol or use drugs. Family History: Family history is unknown by patient. REVIEW OF SYSTEMS:      Constitutional: No fever or chills. No night sweats, positive weight loss. Positive fatigue. Eyes: No eye discharge, double vision, or eye pain   HEENT: negative for sore mouth, sore throat, hoarseness and voice change   Respiratory: negative for cough , sputum, dyspnea, wheezing, hemoptysis, chest pain   Cardiovascular: negative for chest pain, dyspnea, palpitations, orthopnea, PND   Gastrointestinal: negative for nausea, vomiting, diarrhea, constipation, abdominal pain, Dysphagia, hematemesis and hematochezia   Genitourinary: negative for frequency, dysuria, nocturia, urinary incontinence, and hematuria   Integument: negative for rash, skin lesions, bruises.    Hematologic/Lymphatic: negative for easy bruising, bleeding, lymphadenopathy, or petechiae   Endocrine: negative for heat or cold intolerance,weight changes, change in bowel habits and hair loss   Musculoskeletal: negative for myalgias, arthralgias, pain, joint swelling,and bone pain   Neurological: negative for headaches, dizziness, seizures, weakness, numbness    PHYSICAL EXAM:        /63   Pulse 86   Temp 98.1 °F (36.7 °C) (Oral)   Resp 16   Ht 5' 7\" (1.702 m)   Wt 166 lb 9.6 oz (75.6 kg)   SpO2 99%   BMI 26.09 kg/m²    Temp (24hrs), Av.7 °F (36.5 °C), Min:97.3 °F (36.3 °C), Max:98.1 °F (36.7 °C)      General appearance - well appearing, no in pain or distress   Mental status - alert and cooperative   Eyes - pupils equal and reactive, extraocular eye movements intact   Ears - bilateral Protime-INR   Result Value Ref Range    Protime 18.0 (H) 9.7 - 11.6 sec    INR 1.8    Urinalysis, Routine   Result Value Ref Range    Color, UA RED (A) YELLOW    Turbidity UA CLOUDY (A) CLEAR    Glucose, Ur NEGATIVE NEGATIVE    Bilirubin Urine NEGATIVE NEGATIVE    Ketones, Urine NEGATIVE NEGATIVE    Specific Gravity, UA 1.025 1.005 - 1.030    Urine Hgb 3+ (A) NEGATIVE    pH, UA 6.5 5.0 - 8.0    Protein, UA 3+ (A) NEGATIVE    Urobilinogen, Urine Normal Normal    Nitrite, Urine NEGATIVE NEGATIVE    Leukocyte Esterase, Urine NEGATIVE NEGATIVE    Urinalysis Comments NOT REPORTED    Hgb/Hct   Result Value Ref Range    Hemoglobin 7.7 (L) 13.0 - 17.0 g/dL    Hematocrit 24.7 (L) 40.7 - 50.3 %   Hgb/Hct   Result Value Ref Range    Hemoglobin 6.8 (LL) 13.0 - 17.0 g/dL    Hematocrit 21.7 (L) 40.7 - 50.3 %   Microscopic Urinalysis   Result Value Ref Range    -          WBC, UA 10 TO 20 0 - 5 /HPF    RBC, UA TOO NUMEROUS TO COUNT 0 - 2 /HPF    Casts UA NOT REPORTED /LPF    Crystals UA NOT REPORTED None /HPF    Epithelial Cells UA 0 TO 2 0 - 5 /HPF    Renal Epithelial, Urine NOT REPORTED 0 /HPF    Bacteria, UA RARE (A) None    Mucus, UA NOT REPORTED None    Trichomonas, UA NOT REPORTED None    Amorphous, UA NOT REPORTED None    Other Observations UA NOT REPORTED NOT REQ.     Yeast, UA NOT REPORTED None   Hgb/Hct   Result Value Ref Range    Hemoglobin 7.8 (L) 13.0 - 17.0 g/dL    Hematocrit 24.6 (L) 40.7 - 50.3 %   PSA, Diagnostic   Result Value Ref Range    PSA 0.49 <4.1 ug/L   Hgb/Hct   Result Value Ref Range    Hemoglobin 8.4 (L) 13.0 - 17.0 g/dL    Hematocrit 26.1 (L) 40.7 - 50.3 %   CBC with DIFF   Result Value Ref Range    WBC 27.1 (H) 3.5 - 11.3 k/uL    RBC 3.51 (L) 4.21 - 5.77 m/uL    Hemoglobin 9.8 (L) 13.0 - 17.0 g/dL    Hematocrit 29.8 (L) 40.7 - 50.3 %    MCV 84.9 82.6 - 102.9 fL    MCH 27.9 25.2 - 33.5 pg    MCHC 32.9 28.4 - 34.8 g/dL    RDW 16.6 (H) 11.8 - 14.4 %    Platelets 891 923 - 125 k/uL    MPV 12.1 8.1 - 13.5 fL    NRBC Automated 0.3 (H) 0.0 per 100 WBC    Differential Type NOT REPORTED     WBC Morphology NOT REPORTED     RBC Morphology NOT REPORTED     Platelet Estimate NOT REPORTED     Seg Neutrophils 83 (H) 36 - 66 %    Lymphocytes 5 (L) 24 - 44 %    Monocytes 9 (H) 1 - 7 %    Eosinophils % 1 1 - 4 %    Basophils 0 %    Immature Granulocytes 2 (H) 0 %    Segs Absolute 22.49 (H) 1.8 - 7.7 k/uL    Absolute Lymph # 1.36 1.0 - 4.8 k/uL    Absolute Mono # 2.44 (H) 0.2 - 0.8 k/uL    Absolute Eos # 0.27 0.0 - 0.4 k/uL    Basophils # 0.00 0.0 - 0.2 k/uL    Absolute Immature Granulocyte 0.54 (H) 0.00 - 0.30 k/uL   Hgb/Hct   Result Value Ref Range    Hemoglobin 10.1 (L) 13.0 - 17.0 g/dL    Hematocrit 30.9 (L) 40.7 - 50.3 %   Hgb/Hct   Result Value Ref Range    Hemoglobin 9.7 (L) 13.0 - 17.0 g/dL    Hematocrit 31.1 (L) 40.7 - 50.3 %   PROTIME-INR   Result Value Ref Range    Protime 16.1 (H) 9.7 - 11.6 sec    INR 1.6    Hgb/Hct   Result Value Ref Range    Hemoglobin 8.8 (L) 13.0 - 17.0 g/dL    Hematocrit 26.7 (L) 40.7 - 50.3 %   Procalcitonin   Result Value Ref Range    Procalcitonin 3.32 (H) <0.09 ng/mL   Lactic Acid   Result Value Ref Range    Lactic Acid 2.3 (H) 0.5 - 2.2 mmol/L   Lactic Acid   Result Value Ref Range    Lactic Acid 3.3 (H) 0.5 - 2.2 mmol/L   Lactic Acid   Result Value Ref Range    Lactic Acid 1.4 0.5 - 2.2 mmol/L   Urinalysis Reflex to Culture   Result Value Ref Range    Color, UA RED (A) YELLOW    Turbidity UA TURBID (A) CLEAR    Glucose, Ur NEGATIVE NEGATIVE    Bilirubin Urine (A) NEGATIVE     Presumptive positive. Unable to confirm due to unavailability of reagent.     Ketones, Urine 1+ (A) NEGATIVE    Specific [place], UA 1.005 1.005 - 1.030    Urine Hgb 3+ (A) NEGATIVE    pH, UA 6.5 5.0 - 8.0    Protein, UA 3+ (A) NEGATIVE    Urobilinogen, Urine ELEVATED (A) Normal    Nitrite, Urine POSITIVE (A) NEGATIVE    Leukocyte Esterase, Urine LARGE (A) NEGATIVE    Urinalysis Comments NOT REPORTED    Microscopic Platelet count   Result Value Ref Range    Platelets 75 (L) 471 - 453 k/uL   Reticulocytes   Result Value Ref Range    Retic % 9.5 (H) 0.5 - 1.9 %    Absolute Retic # 0.275 (H) 0.030 - 0.080 M/uL    Immature Retic Fract 34.600 (H) 2.7 - 18.3 %    Retic Hemoglobin 32.5 28.2 - 35.7 pg   Hgb/Hct   Result Value Ref Range    Hemoglobin 7.6 (L) 13.0 - 17.0 g/dL    Hematocrit 25.1 (L) 40.7 - 50.3 %   Hgb/Hct   Result Value Ref Range    Hemoglobin 7.9 (L) 13.0 - 17.0 g/dL    Hematocrit 24.9 (L) 40.7 - 50.3 %   CBC Auto Differential   Result Value Ref Range    WBC 24.7 (H) 3.5 - 11.3 k/uL    RBC 2.83 (L) 4.21 - 5.77 m/uL    Hemoglobin 8.4 (L) 13.0 - 17.0 g/dL    Hematocrit 26.8 (L) 40.7 - 50.3 %    MCV 94.7 82.6 - 102.9 fL    MCH 29.7 25.2 - 33.5 pg    MCHC 31.3 28.4 - 34.8 g/dL    RDW 22.0 (H) 11.8 - 14.4 %    Platelets See Reflexed IPF Result 138 - 453 k/uL    MPV  8.1 - 13.5 fL    NRBC Automated 0.2 (H) 0.0 per 100 WBC    Differential Type      WBC Morphology      RBC Morphology      Platelet Estimate      Immature Granulocytes 2 (H) 0 %    Seg Neutrophils 82 (H) 36 - 66 %    Lymphocytes 6 (L) 24 - 44 %    Monocytes 8 (H) 1 - 7 %    Eosinophils % 2 1 - 4 %    Basophils 0 0 - 2 %    Absolute Immature Granulocyte 0.49 (H) 0.00 - 0.30 k/uL    Segs Absolute 20.26 (H) 1.8 - 7.7 k/uL    Absolute Lymph # 1.48 1.0 - 4.8 k/uL    Absolute Mono # 1.98 (H) 0.1 - 0.8 k/uL    Absolute Eos # 0.49 (H) 0.0 - 0.4 k/uL    Basophils # 0.00 0.0 - 0.2 k/uL    Morphology ANISOCYTOSIS PRESENT     Morphology 1+ POLYCHROMASIA     Morphology 1+ ACANTHOCYTES    Immature Platelet Fraction   Result Value Ref Range    Platelet, Immature Fraction 11.7 (H) 1.1 - 10.3 %    Platelet, Fluorescence 73 (L) 138 - 453 k/uL   PROTIME-INR   Result Value Ref Range    Protime 19.4 (H) 9.7 - 11.6 sec    INR 1.9    Hgb/Hct   Result Value Ref Range    Hemoglobin 8.4 (L) 13.0 - 17.0 g/dL    Hematocrit 27.0 (L) 40.7 - 50.3 %   Urinalysis, Routine   Result Value Ref gastrectomy. No bowel obstruction or abnormal bowel wall thickening. Pelvis: 4 cm soft tissue density dependently within the urinary bladder lumen. No calcifications within the lumen. Prostatic radiation beads are noted. No free fluid in the pelvis. No pelvic or inguinal lymphadenopathy. Peritoneum/Retroperitoneum: The abdominal aorta is normal in caliber with moderate calcific plaquing. There is significant retroperitoneal lymphadenopathy new from 2017. This includes a 5.2 x 3.2 cm node on axial image 89. Small volume of ascites. No pneumoperitoneum. Bones/Soft Tissues: Postoperative scarring in the anterior abdominal wall. Status post median sternotomy. No acute or suspicious osseous abnormality. 1. Findings compatible with metastatic disease including numerous large ill-defined hepatic lesions, bulky retroperitoneal lymphadenopathy, and small scattered pulmonary nodules in the lung bases new from 2017. Small volume of ascites which may be malignant. 2. Status post partial pancreatectomy and gastrectomy. No bowel obstruction. 3. 4 cm soft tissue density within the urinary bladder lumen may represent clot with a soft tissue mass not excluded. No obstructive uropathy or urinary collecting system calculi. Consider further evaluation with cystoscopy. 4. Small left pleural effusion. Xr Chest (single View Frontal)    Result Date: 7/15/2019  EXAMINATION: ONE XRAY VIEW OF THE CHEST 7/15/2019 10:27 pm COMPARISON: 05/13/2017. HISTORY: ORDERING SYSTEM PROVIDED HISTORY: pulmonary vascular congestion TECHNOLOGIST PROVIDED HISTORY: pulmonary vascular congestion Reason for Exam: Pulmonary vascular congestion Acuity: Acute Type of Exam: Unknown FINDINGS: Sternotomy wires noted. The cardiomediastinal silhouette is mildly in size and contour. The lungs are clear. No pleural effusion or pneumothorax is present.      No acute cardiopulmonary process     [facility]    Result Date: the content of the visit, the document can still have some errors including those of syntax and sound a like substitutions which may escape proof reading. It such instances, actual meaning can be extrapolated by contextual diversion.

## 2019-07-24 NOTE — CARE COORDINATION
Discharge planning    Patient chart reviewed. Seen by palliative care they did discuss goals of care and code status. Wife wants to follow up with attending prior to committing to any decisions. IM will need paracentesis today. hgb stable. Check with cardiology re coumadin     CARD:  Mechanical valve, continue with coumadin QD and hematuria resolved. Patient [facility] home with [facility] dc. Has walker, transport w/c, cane and shower chair. Discharging to Facility/ Agency   · Name: [facility]  · Address:  · [facility]: [phone]  · Fax: [phone]      Dr Caden Santiago up and discussed plan of care. Patient and family agreeable to palliative care with home care if able. Patient can have both in the home. Perfect serve Monse Landa from palliative to request sincera referral. Call to [facility] and updated on stay and palliative to also be in home     Will have paracentesis today and then hope to dc tomorrow.

## 2019-07-25 PROBLEM — R18.8 ASCITES: Status: ACTIVE | Noted: 2019-01-01

## 2019-07-25 PROBLEM — I95.9 HYPOTENSION: Status: ACTIVE | Noted: 2019-01-01

## 2019-07-25 PROBLEM — D65 DIC (DISSEMINATED INTRAVASCULAR COAGULATION) (HCC): Status: ACTIVE | Noted: 2019-01-01

## 2019-07-25 PROBLEM — Z95.2 PRESENCE OF PROSTHETIC HEART VALVE: Status: ACTIVE | Noted: 2019-01-01

## 2019-07-25 PROBLEM — C25.9 CANCER OF PANCREAS (HCC): Status: ACTIVE | Noted: 2019-01-01

## 2019-07-25 NOTE — PROGRESS NOTES
Alert, in no distress. Paracentesis done today - fluid sent for labs. Abdomen: distention has improved following paracentesis. Lungs clear. BP still on the low side - continuing Midodrine. Leg edema about the same. Compared to condition on admission, patient has been stabilized and can be discharged  Home (pt declined SNF). Palliative Care and [facility] will be following. Pt also to follow with Oncology and Urology. Will follow-up in office next week. Wife and patient are aware of diagnosis and prognosis. Discussed. Questions answered/expressed understanding. Meds reconciled/scripts including bedside commode and wheelchair. Patient states he does not need a hospital bed at this time. His Coumadin (has prosthetic heart valve) is being managed by Cardiology Service. Addendum note:   - follow-up labs as an out-patient.

## 2019-07-25 NOTE — PROGRESS NOTES
INR 1.8    Urinalysis, Routine   Result Value Ref Range    Color, UA RED (A) YELLOW    Turbidity UA CLOUDY (A) CLEAR    Glucose, Ur NEGATIVE NEGATIVE    Bilirubin Urine NEGATIVE NEGATIVE    Ketones, Urine NEGATIVE NEGATIVE    Specific Gravity, UA 1.025 1.005 - 1.030    Urine Hgb 3+ (A) NEGATIVE    pH, UA 6.5 5.0 - 8.0    Protein, UA 3+ (A) NEGATIVE    Urobilinogen, Urine Normal Normal    Nitrite, Urine NEGATIVE NEGATIVE    Leukocyte Esterase, Urine NEGATIVE NEGATIVE    Urinalysis Comments NOT REPORTED    Hgb/Hct   Result Value Ref Range    Hemoglobin 7.7 (L) 13.0 - 17.0 g/dL    Hematocrit 24.7 (L) 40.7 - 50.3 %   Hgb/Hct   Result Value Ref Range    Hemoglobin 6.8 (LL) 13.0 - 17.0 g/dL    Hematocrit 21.7 (L) 40.7 - 50.3 %   Microscopic Urinalysis   Result Value Ref Range    -          WBC, UA 10 TO 20 0 - 5 /HPF    RBC, UA TOO NUMEROUS TO COUNT 0 - 2 /HPF    Casts UA NOT REPORTED /LPF    Crystals UA NOT REPORTED None /HPF    Epithelial Cells UA 0 TO 2 0 - 5 /HPF    Renal Epithelial, Urine NOT REPORTED 0 /HPF    Bacteria, UA RARE (A) None    Mucus, UA NOT REPORTED None    Trichomonas, UA NOT REPORTED None    Amorphous, UA NOT REPORTED None    Other Observations UA NOT REPORTED NOT REQ.     Yeast, UA NOT REPORTED None   Hgb/Hct   Result Value Ref Range    Hemoglobin 7.8 (L) 13.0 - 17.0 g/dL    Hematocrit 24.6 (L) 40.7 - 50.3 %   PSA, Diagnostic   Result Value Ref Range    PSA 0.49 <4.1 ug/L   Hgb/Hct   Result Value Ref Range    Hemoglobin 8.4 (L) 13.0 - 17.0 g/dL    Hematocrit 26.1 (L) 40.7 - 50.3 %   CBC with DIFF   Result Value Ref Range    WBC 27.1 (H) 3.5 - 11.3 k/uL    RBC 3.51 (L) 4.21 - 5.77 m/uL    Hemoglobin 9.8 (L) 13.0 - 17.0 g/dL    Hematocrit 29.8 (L) 40.7 - 50.3 %    MCV 84.9 82.6 - 102.9 fL    MCH 27.9 25.2 - 33.5 pg    MCHC 32.9 28.4 - 34.8 g/dL    RDW 16.6 (H) 11.8 - 14.4 %    Platelets 712 350 - 988 k/uL    MPV 12.1 8.1 - 13.5 fL    NRBC Automated 0.3 (H) 0.0 per 100 WBC    Differential Type NOT /HPF    RBC, UA TOO NUMEROUS TO COUNT 0 - 2 /HPF    Casts UA 0 TO 2 /LPF    Casts UA HYALINE /LPF    Crystals UA NOT REPORTED None /HPF    Epithelial Cells UA 0 TO 2 0 - 5 /HPF    Renal Epithelial, Urine NOT REPORTED 0 /HPF    Bacteria, UA MANY (A) None    Mucus, UA NOT REPORTED None    Trichomonas, UA NOT REPORTED None    Amorphous, UA NOT REPORTED None    Other Observations UA NOT REPORTED NOT REQ.     Yeast, UA NOT REPORTED None   Hgb/Hct   Result Value Ref Range    Hemoglobin 9.1 (L) 13.0 - 17.0 g/dL    Hematocrit 28.7 (L) 40.7 - 50.3 %   PROTIME-INR   Result Value Ref Range    Protime 16.7 (H) 9.7 - 11.6 sec    INR 1.7    Lactic Acid   Result Value Ref Range    Lactic Acid 2.1 0.5 - 2.2 mmol/L   Hgb/Hct   Result Value Ref Range    Hemoglobin 9.6 (L) 13.0 - 17.0 g/dL    Hematocrit 29.4 (L) 40.7 - 50.3 %   Lactic Acid   Result Value Ref Range    Lactic Acid 2.0 0.5 - 2.2 mmol/L   Hgb/Hct   Result Value Ref Range    Hemoglobin 8.8 (L) 13.0 - 17.0 g/dL    Hematocrit 27.7 (L) 40.7 - 50.3 %   CBC with DIFF   Result Value Ref Range    WBC 29.8 (H) 3.5 - 11.3 k/uL    RBC 3.45 (L) 4.21 - 5.77 m/uL    Hemoglobin 9.6 (L) 13.0 - 17.0 g/dL    Hematocrit 30.6 (L) 40.7 - 50.3 %    MCV 88.7 82.6 - 102.9 fL    MCH 27.8 25.2 - 33.5 pg    MCHC 31.4 28.4 - 34.8 g/dL    RDW 17.4 (H) 11.8 - 14.4 %    Platelets 94 (L) 509 - 453 k/uL    MPV 11.8 8.1 - 13.5 fL    NRBC Automated 0.9 (H) 0.0 per 100 WBC    Differential Type NOT REPORTED     WBC Morphology NOT REPORTED     RBC Morphology NOT REPORTED     Platelet Estimate NOT REPORTED     Seg Neutrophils 83 (H) 36 - 66 %    Lymphocytes 5 (L) 24 - 44 %    Monocytes 8 (H) 1 - 7 %    Eosinophils % 1 1 - 4 %    Basophils 0 %    Immature Granulocytes 3 (H) 0 %    Segs Absolute 24.74 (H) 1.8 - 7.7 k/uL    Absolute Lymph # 1.49 1.0 - 4.8 k/uL    Absolute Mono # 2.38 (H) 0.2 - 0.8 k/uL    Absolute Eos # 0.30 0.0 - 0.4 k/uL    Basophils # 0.00 0.0 - 0.2 k/uL    Absolute Immature Granulocyte 0.89 Glucose, Ur NEGATIVE NEGATIVE    Bilirubin Urine NEGATIVE NEGATIVE    Ketones, Urine NEGATIVE NEGATIVE    Specific Gravity, UA 1.015 1.005 - 1.030    Urine Hgb 2+ (A) NEGATIVE    pH, UA 5.5 5.0 - 8.0    Protein, UA TRACE (A) NEGATIVE    Urobilinogen, Urine ELEVATED (A) Normal    Nitrite, Urine NEGATIVE NEGATIVE    Leukocyte Esterase, Urine TRACE (A) NEGATIVE    Urinalysis Comments NOT REPORTED    Haptoglobin   Result Value Ref Range    Haptoglobin <10 (L) 30 - 200 mg/dL   Microscopic Urinalysis   Result Value Ref Range    -          WBC, UA 2 TO 5 0 - 5 /HPF    RBC, UA 2 TO 5 0 - 2 /HPF    Casts UA 0 TO 2 /LPF    Casts UA HYALINE /LPF    Crystals UA NOT REPORTED None /HPF    Epithelial Cells UA 0 TO 2 0 - 5 /HPF    Renal Epithelial, Urine NOT REPORTED 0 /HPF    Bacteria, UA NOT REPORTED None    Mucus, UA 1+ (A) None    Trichomonas, UA NOT REPORTED None    Amorphous, UA NOT REPORTED None    Other Observations UA NOT REPORTED NOT REQ.     Yeast, UA NOT REPORTED None   Ammonia   Result Value Ref Range    Ammonia 107 (H) 16 - 60 umol/L   PROTIME-INR   Result Value Ref Range    Protime 17.3 (H) 9.7 - 11.6 sec    INR 1.7    Hgb/Hct   Result Value Ref Range    Hemoglobin 8.4 (L) 13.0 - 17.0 g/dL    Hematocrit 27.4 (L) 40.7 - 50.3 %   Albumin   Result Value Ref Range    Alb 3.5 3.5 - 5.2 g/dL   Basic Metabolic Prof   Result Value Ref Range    Glucose 104 (H) 70 - 99 mg/dL    BUN 34 (H) 8 - 23 mg/dL    CREATININE 1.02 0.70 - 1.20 mg/dL    Bun/Cre Ratio 33 (H) 9 - 20    Calcium 8.7 8.6 - 10.4 mg/dL    Sodium 139 135 - 144 mmol/L    Potassium 3.8 3.7 - 5.3 mmol/L    Chloride 101 98 - 107 mmol/L    CO2 24 20 - 31 mmol/L    Anion Gap 14 9 - 17 mmol/L    GFR Non-African American >60 >60 mL/min    GFR African American >60 >60 mL/min    GFR Comment          GFR Staging NOT REPORTED    Surgical Pathology   Result Value Ref Range    Surgical Pathology Report       [id number]  [facility]  [employer]  CONSULTING PATHOLOGISTS [address] [facility], [address]  [phone]  Fax: [phone] [address] [facility]    Patient Name: Nathanael Coyle  MR#: [id number]  Specimen #[id number]    Procedures/Addenda  PERIPHERAL BLOOD REPORT     Date Ordered:     7/22/2019     Status:  Signed Out       Date Complete:     7/22/2019     By: Lamar Dakin, M.D. Date Reported:     7/22/2019       INTERPRETATION  PERIPHERAL BLOOD:  REACTIVE NEUTROPHILIA. MODERATE NORMOCYTIC ANEMIA WITH  RETICULOCYTOSIS. HYPERPROLIFERATIVE ANEMIA DIFFERENTIAL INCLUDES  POSTHEMORRHAGIC ANEMIA, HEMOLYTIC ANEMIA, TREATED NUTRITIONAL ANEMIA,  ETC.  CLINICAL HISTORY IS POSITIVE FOR HEMATURIA/RADIATION CYSTITIS,  MOST COMPATIBLE WITH POSTHEMORRHAGIC ANEMIA, HOWEVER, NOTE HAPTOGLOBIN  <10, ALSO SUGGESTING CONSIDERATION OF HEMOLYSIS. MODERATE  THROMBOCYTOPENIA.       RESULTS-COMMENTS                           PERIPHERAL BLOOD STUD Y    HEMOGRAM                              DIFFERENTIAL %     ABSOLUTE  (K/UL)    WBC (K/uL)     24.7               IMM GRANS          2          0.49        RBC (K/uL)     2.83               SEGS               82          20.26  HGB (G/dL)     8.4               LYMPHS          6          1.48  HCT (%)     26.8                                     MCV (FL.)     94.7               MONOS          8          1.98  MCH (PG.)     29.7               EOS               2          0.49  MCHC (g/dL)     31.3               BASO                           RDW (%)     22.0                                          PLT (k/uL)     73                                          RETIC (%)     9.5                                     Absolute RetCt     0.275                                                                                                                         PERIPHERAL EXAMINATION BY PATHOLOGIST    PLATELETS: Normal           LEUKOCYTES: Mild neutrophilic left shift and toxic granulation pursue further treatment for his pancreatic cancer or not. Prognosis is guarded. Thank you for asking us to see this patient. Tomer De La Cruz MD          This note is created with the assistance of a speech recognition program.  While intending to generate a document that actually reflects the content of the visit, the document can still have some errors including those of syntax and sound a like substitutions which may escape proof reading. It such instances, actual meaning can be extrapolated by contextual diversion.

## 2019-07-25 NOTE — DISCHARGE SUMMARY
this discharge summary is in conjunction with any daily progress note from day of discharge. Code Status:  Full Code    Hospital Course: complicated/prolonged - stabilized. Consults:  cardiology, hematology/oncology,urology, interventional radiology, palliative care. Significant Diagnostic Studies: see EHR. Treatments: Medical, Interventional (cysto/paracentesis). Disposition: home. Patient refused SNF. Discharged Condition: Stable    Follow Up:  Lucy Pandya MD in one week. Palliative Care [facility]io Miguel[facility] Oncology, Urology, Cardiology. Note: Cardiology (Dr. Luiza Munroe) also following his anticoagulation (Coumadin). Discharge Medications:    [facility]:[id number]    Printed on:07/25/19 5675   Medication Information                      amoxicillin (AMOXIL) 500 MG capsule  Take 1 capsule by mouth 2 times daily             lactulose (CHRONULAC) 10 GM/15ML solution  Take 30 mLs by mouth 3 times daily             midodrine (PROAMATINE) 10 MG tablet  Take 1 tablet by mouth 3 times daily (with meals)             Multiple Vitamins-Minerals (THERAPEUTIC MULTIVITAMIN-MINERALS) tablet  Take 1 tablet by mouth daily. oxyCODONE (ROXICODONE) 5 MG immediate release tablet  Take 5 mg by mouth every 4 hours as needed for Pain.             pantoprazole (PROTONIX) 40 MG tablet  Take 1 tablet by mouth every morning (before breakfast)             senna-docusate (PERICOLACE) 8.6-50 MG per tablet  Take 1 tablet by mouth daily as needed for Constipation              warfarin (COUMADIN) 5 MG tablet  Take 5-7.5 mg by mouth daily                   Activity: activity as tolerated    Diet: regular diet    Time Spent on discharge is more than 45 minutes in the examination, evaluation, counseling and review of medications and discharge plan.       Electronically signed by Lucy Pandya MD on 7/25/2019 at 7:09 PM

## 2019-07-25 NOTE — CARE COORDINATION
Discharge planning  1000= noted pt will like hosp bed, w/c and bsc  Discussed w/ RN , said will talk toDr Maggie Nuno when he comes in for the scripts  Still waiting for the scripts for the DME

## 2019-07-25 NOTE — PLAN OF CARE
Problem: Falls - Risk of:  Goal: Will remain free from falls  Description  Will remain free from falls  Outcome: Ongoing   Pt fall risk, fall band present, falling star, safety alarm activated and in use as needed. Hourly rounding performed. Pt encouraged to use call light. See Chiki Amaya fall risk assessment.

## 2019-07-25 NOTE — PROGRESS NOTES
Early satiety     Signs and symptoms:  as evidenced by Diet history of poor intake, Intake 0-25%, Localized or generalized fluid accumulation, Patient report of, Intake 25-50%, Moderate loss of subcutaneous fat, Moderate muscle loss    Objective Information:  · Nutrition-Focused Physical Findings: GI: gross ascited, rounded, last BM 7/24, passing flatus, active bowel sounds; PV: +3 pitting RLE, +2 pitting LLE; Skin: WDL  · Wound Type: None  · Current Nutrition Therapies:  · Oral Diet Orders: General   · Oral Diet intake: 51-75%, 26-50%, 0%  · Oral Nutrition Supplement (ONS) Orders: Standard High Calorie Oral Supplement  · ONS intake: 26-50%, 51-75%  · Anthropometric Measures:  · Ht: 5' 7\" (170.2 cm)   · Current Body Wt: 166 lb 9.6 oz (75.6 kg)  · Admission Body Wt: 159 lb 2.8 oz (72.2 kg)  · Usual Body Wt: 160 lb 0.9 oz (72.6 kg)(4/25/19)  · % Weight Change:  None  · Ideal Body Wt: 154 lb 5.2 oz (70 kg), % Ideal Body 103%  · BMI Classification: BMI 25.0 - 29.9 Overweight    Nutrition Interventions:   Continue current diet, Continue current ONS  Continued Inpatient Monitoring, Coordination of Care    Nutrition Evaluation:   · Evaluation: No progress toward goals   · Goals: PO intake to meet >75% of estimated kcal/protein needs, with good GI tolerance    · Monitoring: Nutrition Progression, Meal Intake, Supplement Intake, Diet Tolerance, Skin Integrity, I&O, Weight, Pertinent Labs, Constipation, Monitor Bowel Function      Electronically signed by Flip Enciso RD, LD on 7/25/19 at 3:21 PM    Contact Number: [phone]

## 2019-07-25 NOTE — PROGRESS NOTES
Subjective: Had paracentesis today. Denies CP or dyspnea. No further hematuria    VS: /65   Pulse 98   Temp 97.7 °F (36.5 °C) (Oral)   Resp 18   Ht 5' 7\" (1.702 m)   Wt 166 lb 9.6 oz (75.6 kg)   SpO2 100%   BMI 26.09 kg/m²     Wt:     I/O: In: 240 [P.O.:240]  Out: 1200 [Urine:200]    Monitor: SR, PVCs    Meds: Scheduled Meds:   warfarin  3 mg Oral Daily    lactulose  20 g Oral TID    bumetanide  1 mg Intravenous BID    midodrine  10 mg Oral TID WC    pantoprazole  40 mg Oral QAM AC    sodium chloride  250 mL Intravenous Once    albumin human  25 g Intravenous Daily    lubiprostone  8 mcg Oral BID WC    warfarin (COUMADIN) daily dosing (placeholder)   Other RX Placeholder    sodium chloride flush  10 mL Intravenous 2 times per day    cefTRIAXone (ROCEPHIN) IV  1 g Intravenous Q24H    therapeutic multivitamin-minerals  1 tablet Oral Daily     Continuous Infusions:  PRN Meds:. HYDROmorphone, oxyCODONE, sodium chloride flush, prochlorperazine, lidocaine, sennosides-docusate sodium     Exam:General Appearance: AOX3, NAD  Skin: warm dry  Pulmonary/Chest: CTA anteriorly  Cardiovascular: S1S2, RRR, negative JVD  Extremities: Moderate peripheral edema, stockings on    Labs:   CBC with Differential:    Lab Results   Component Value Date    WBC 24.7 07/20/2019    RBC 2.83 07/20/2019    HGB 9.4 07/25/2019    HCT 31.6 07/25/2019    PLT 75 07/20/2019    PLT See Reflexed IPF Result 07/20/2019    MCV 94.7 07/20/2019    MCH 29.7 07/20/2019    MCHC 31.3 07/20/2019    RDW 22.0 07/20/2019    LYMPHOPCT 6 07/20/2019    MONOPCT 8 07/20/2019    BASOPCT 0 07/20/2019    MONOSABS 1.98 07/20/2019    LYMPHSABS 1.48 07/20/2019    EOSABS 0.49 07/20/2019    BASOSABS 0.00 07/20/2019    DIFFTYPE NOT REPORTED 07/13/2019       BMP:    Lab Results   Component Value Date     07/22/2019    K 3.8 07/22/2019     07/22/2019    CO2 24 07/22/2019    BUN 34 07/22/2019    LABALBU 3.5 07/22/2019    CREATININE 1.02 07/22/2019

## 2019-07-31 PROBLEM — M79.2 INTRACTABLE NEUROPATHIC PAIN OF LOWER EXTREMITY, RIGHT: Status: ACTIVE | Noted: 2019-01-01

## 2019-07-31 PROBLEM — D68.59 HYPERPROTHROMBINEMIA (HCC): Status: ACTIVE | Noted: 2019-01-01

## 2019-07-31 PROBLEM — M79.2: Status: ACTIVE | Noted: 2019-01-01

## 2019-07-31 PROBLEM — Z85.89: Status: ACTIVE | Noted: 2019-01-01

## 2019-07-31 NOTE — ED PROVIDER NOTES
osseous abnormality in the right femur. No acute fracture. Mild right hip joint osteoarthritis. Partial visualization pelvic ascites. Diffuse subcutaneous edema/fluid         XR FEMUR RIGHT (MIN 2 VIEWS)   Final Result   Linear lucency within the right posterior acetabulum is likely artifactual,   however if there is clinical suspicion for fracture, further evaluation with   CT should be considered. Metallic screw foreign body within the soft tissues overlying the mid femoral   diaphysis. LABS:  Labs Reviewed   CBC WITH AUTO DIFFERENTIAL - Abnormal; Notable for the following components:       Result Value    WBC 18.8 (*)     RBC 3.09 (*)     Hemoglobin 9.2 (*)     Hematocrit 29.2 (*)     RDW 21.0 (*)     Platelets 184 (*)     NRBC Automated 0.1 (*)     Seg Neutrophils 85 (*)     Lymphocytes 6 (*)     Monocytes 8 (*)     Segs Absolute 15.98 (*)     Absolute Mono # 1.50 (*)     All other components within normal limits   BASIC METABOLIC PANEL - Abnormal; Notable for the following components:    Glucose 109 (*)     BUN 31 (*)     CREATININE 1.21 (*)     Bun/Cre Ratio 26 (*)     Calcium 8.2 (*)     All other components within normal limits   PROTIME-INR - Abnormal; Notable for the following components:    Protime 47.8 (*)     INR 4.9 (*)     All other components within normal limits       All other labs were within normal range or not returned as of this dictation. EMERGENCY DEPARTMENT COURSE and DIFFERENTIAL DIAGNOSIS/MDM:   Vitals:    Vitals:    19 2112   BP: 117/72   Pulse: 80   Resp: 20   Temp: 98.2 °F (36.8 °C)   TempSrc: Oral   SpO2: 100%   Weight: 160 lb (72.6 kg)   Height: 5' 9\" (1.753 m)       Medical Decision Makin-year-old male with generalized weakness and inability to ambulate. His white count is over 18,000. Additional blood work is unremarkable. Plain films inconclusive. CT scan does not have any significant abnormalities. He is generally weak.   He is unable

## 2019-07-31 NOTE — PROGRESS NOTES
Pharmacy Note  PCA Pharmacy To Dose - Initial Note    Wilner Scanlon is a 71 y.o. male ordered patient controlled analgesia (PCA) with Hydromorphone; consult received from Dr. Sveta Carpio. PCA Indication: hip pain    Patient is also receiving the following pain medications:  none    Dose is based on BSA= 1.88m2    PCA Dose: 0.23 mg  Lockout: 10 minutes  Basal Rate:  No basal rate ordered by physician. 1 hour dose limit: 1.38 mg    Loading dose: 0.4  Mg x1. Thank you for the consult. Nursing to contact pharmacy with PCA pain management concerns.     SHAN CHACON  7/31/2019  3:13 AM

## 2019-07-31 NOTE — CONSULTS
Value Date     07/31/2019    K 3.8 07/31/2019     07/31/2019    CO2 22 07/31/2019    BUN 33 07/31/2019    CREATININE 1.22 07/31/2019    GFRAA >60 07/31/2019    LABGLOM 60 07/31/2019    GLUCOSE 90 07/31/2019    PROT 6.1 07/31/2019    LABALBU 3.4 07/31/2019    CALCIUM 8.3 07/31/2019    BILITOT 2.62 07/31/2019    ALKPHOS 369 07/31/2019     07/31/2019    ALT 47 07/31/2019     PT/INR:    Lab Results   Component Value Date    PROTIME 51.6 07/31/2019    PROTIME 37.7 11/11/2016    INR 5.3 07/31/2019       A/P:1. Mechanical AVR  -INR supratherapuetic. Hold Coumadin. INR QD     2. MR/TR  -Patient is being treated with medical therapy.      3.  Hypotension  -BP stable on Midodrine    Plans for patient to meet with Hospice this afternoon     Will discuss with Dr. Marcelo Mayorga    Electronically signed by BRENDA Montalvo - CNP on 7/31/2019 at 11:21 AM

## 2019-07-31 NOTE — CARE COORDINATION
Social Work: Writer informed of hospice consult, requesting hospice [address] [facility], [place].   Referral made, meeting arranged for 12:00PM.

## 2019-07-31 NOTE — CONSULTS
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 The patient has advanced pancreatic cancer with liver metastases  2. Condition has been deteriorating and performance status has been worsening  3. I do not think he is a candidate for any aggressive therapy and unfortunately multiple comorbidities and poor performance status will preclude him from entering any clinical trial  4. The only feasible option would be palliative care and hospice. 5. The patient and his wife are not very clear about the goals of care. I recommended hospice consultation for evaluation  6. I recommend home with hospice or placement. 7. Continue supportive care. I will review the CT scan. Preliminary testing showed no evidence of metastatic disease. Discussed with patient and Nurse.     Elliot Penny MD   [phone]

## 2019-07-31 NOTE — PLAN OF CARE
Problem: Falls - Risk of:  Goal: Will remain free from falls  Description  Will remain free from falls  7/31/2019 1627 by Mait Sanchez RN  Outcome: Met This Shift  7/31/2019 0454 by Raymond Paniagua RN  Outcome: Ongoing  Goal: Absence of physical injury  Description  Absence of physical injury  7/31/2019 1627 by Mati Sanchez RN  Outcome: Met This Shift  7/31/2019 0454 by Raymond Paniagua RN  Outcome: Ongoing     Problem: Infection:  Goal: Will remain free from infection  Description  Will remain free from infection  7/31/2019 1627 by Mati Sanchez RN  Outcome: Met This Shift  7/31/2019 0454 by Raymond Paniagua RN  Outcome: Ongoing     Problem: Safety:  Goal: Free from accidental physical injury  Description  Free from accidental physical injury  Outcome: Met This Shift  Goal: Free from intentional harm  Description  Free from intentional harm  Outcome: Met This Shift     Problem: Daily Care:  Goal: Daily care needs are met  Description  Daily care needs are met  7/31/2019 1627 by Mati Sanchez RN  Outcome: Met This Shift  7/31/2019 0454 by Raymond Paniagua RN  Outcome: Ongoing     Problem: Discharge Planning:  Goal: Patients continuum of care needs are met  Description  Patients continuum of care needs are met  Outcome: Met This Shift     Problem: Pain:  Goal: Pain level will decrease  Description  Pain level will decrease  7/31/2019 1627 by Mati Sanchez RN  Outcome: Ongoing  7/31/2019 0454 by Raymond Paniagua RN  Outcome: Ongoing  Goal: Patient's pain/discomfort is manageable  Description  Patient's pain/discomfort is manageable  7/31/2019 1627 by Mati Sanchez RN  Outcome: Ongoing  7/31/2019 0454 by Raymond Paniagua RN  Outcome: Ongoing     Problem: Skin Integrity:  Goal: Skin integrity will stabilize  Description  Skin integrity will stabilize  Outcome: Ongoing     Problem: Pain:  Goal: Control of acute pain  Description  Control of acute pain  7/31/2019 1627 by John Garrison

## 2019-07-31 NOTE — H&P
Department of Internal Medicine  Attending History and Physical        CHIEF COMPLAINT:  Pain to the right leg/hip    History Obtained From:     HISTORY OF PRESENT ILLNESS:    Earlier in the evening home health care nurse informed me that patient had been having worsening pain to the right leg/hip and that patient could barely ambulate. Patient has been taking his oxycodone at home with no response. Pt has metastatic pancreatic malignancy and was recently admitted to [facility] on the 10th of July for multiple medical problems (please refer to EHR for details). He was discharged stable on the 25th with home health care and palliative care follow-up.      Past Medical/Surgical History:  Past Medical History:   Diagnosis Date    Acid reflux     Arthritis     CAD (coronary artery disease)     Endocarditis     Enterococcal sepsis (HCC)     Pancreatic carcinoma (HCC)     Positive cardiac stress test     Prostate cancer (HCC)     Elevated PSA     Prosthetic valve endocarditis, subsequent encounter     Unspecified cerebral artery occlusion with cerebral infarction     TIA         Past Surgical History:   Procedure Laterality Date    AORTIC VALVE REPLACEMENT  1989    CARDIAC VALVE REPLACEMENT  [id number]    AORTIC VALVE REPLACEMENT (PIG VALVE 1979,ST [address] [facility])    COLONOSCOPY  4/5/2015    CYSTOSCOPY  8/18/15    CYSTOSCOPY  12/02/2016    no biopsy    CYSTOSCOPY  07/12/2019    CYSTOSCOPY WITH BLADDER IRRIGATION     CYSTOSCOPY N/A 7/12/2019    CYSTOSCOPY WITH BLADDER IRRIGATION, CLOT EVACUATION, CAUTERIZATION OF BLEEDING performed by Beni Malin MD at [address] [place] [address]  07/19/2019    evacuation of clots, cauterazation    CYSTOSCOPY N/A 7/19/2019    CYSTOSCOPY EVACUATION OF CLOTS BLADDER IRRIGATION AND CAUTERIZATION OF BLEEDING performed by Beni Malin MD at [place] [address]  8/1194    UMBILICAL,EXCISION ON ABDOMINAL WALL CYST    OTHER SURGICAL HISTORY      ANAL FISSURE REPAIRED  TONSILLECTOMY         No current facility-administered medications on file prior to encounter. Current Outpatient Medications on File Prior to Encounter   Medication Sig Dispense Refill    lactulose (CHRONULAC) 10 GM/15ML solution Take 30 mLs by mouth 3 times daily 1 Bottle 2    midodrine (PROAMATINE) 10 MG tablet Take 1 tablet by mouth 3 times daily (with meals) 90 tablet 1    pantoprazole (PROTONIX) 40 MG tablet Take 1 tablet by mouth every morning (before breakfast) 30 tablet 1    senna-docusate (PERICOLACE) 8.6-50 MG per tablet Take 1 tablet by mouth daily as needed for Constipation       oxyCODONE (ROXICODONE) 5 MG immediate release tablet Take 5 mg by mouth every 4 hours as needed for Pain.  amoxicillin (AMOXIL) 500 MG capsule Take 1 capsule by mouth 2 times daily 60 capsule 5    warfarin (COUMADIN) 5 MG tablet Take 5-7.5 mg by mouth daily       Multiple Vitamins-Minerals (THERAPEUTIC MULTIVITAMIN-MINERALS) tablet Take 1 tablet by mouth daily. Allergies:  Patient has no known allergies.     Social History:   Social History     Socioeconomic History    Marital status:      Spouse name: Not on file    Number of children: Not on file    Years of education: Not on file    Highest education level: Not on file   Occupational History    Not on file   Social Needs    Financial resource strain: Not on file    Food insecurity:     Worry: Not on file     Inability: Not on file    Transportation needs:     Medical: Not on file     Non-medical: Not on file   Tobacco Use    Smoking status: Former Smoker    Smokeless tobacco: Never Used   Substance and Sexual Activity    Alcohol use: No    Drug use: No    Sexual activity: Not on file   Lifestyle    Physical activity:     Days per week: Not on file     Minutes per session: Not on file    Stress: Not on file   Relationships    Social connections:     Talks on phone: Not on file     Gets together: Not on file     Attends

## 2019-08-09 PROBLEM — N39.0 URINARY TRACT INFECTION: Status: RESOLVED | Noted: 2019-01-01 | Resolved: 2019-08-09

## 2019-08-09 PROBLEM — N39.0 UTI (URINARY TRACT INFECTION): Status: RESOLVED | Noted: 2019-01-01 | Resolved: 2019-08-09

## (undated) DEVICE — Z INACTIVE USE 2635503 SOLUTION IRRIG 3000ML ST H2O USP UROMATIC PLAS CONT

## (undated) DEVICE — DUP USE 240185 SOLUTION IV IRRIG WATER 1000ML IRRIG BAG 2B7114

## (undated) DEVICE — GLOVE SURG SZ 75 L12IN FNGR THK87MIL WHT LTX FREE

## (undated) DEVICE — TUR/ENDOSCOPIC CABLE, 10' (3.05 M): Brand: CONMED

## (undated) DEVICE — Z INACTIVE PER BARD SYRINGE IRRIG 70ML PLASTICTOOMEY DISPOSABLE

## (undated) DEVICE — Y-TYPE TUR/BLADDER IRRIGATION SET, REGULATING CLAMP

## (undated) DEVICE — CONTAINER,SPECIMEN,OR STERILE,4OZ: Brand: MEDLINE

## (undated) DEVICE — Device

## (undated) DEVICE — GLOVE SURG SZ 7 L12IN FNGR THK87MIL WHT LTX FREE

## (undated) DEVICE — STERILE PREPRINTED LABELS W/ D: Brand: MEDLINE